# Patient Record
Sex: FEMALE | Race: WHITE
[De-identification: names, ages, dates, MRNs, and addresses within clinical notes are randomized per-mention and may not be internally consistent; named-entity substitution may affect disease eponyms.]

---

## 2019-01-28 ENCOUNTER — HOSPITAL ENCOUNTER (OUTPATIENT)
Dept: HOSPITAL 74 - JER | Age: 79
Setting detail: OBSERVATION
LOS: 2 days | Discharge: HOME HEALTH | End: 2019-01-30
Attending: NURSE PRACTITIONER | Admitting: INTERNAL MEDICINE
Payer: COMMERCIAL

## 2019-01-28 VITALS — BODY MASS INDEX: 29.3 KG/M2

## 2019-01-28 DIAGNOSIS — E78.5: ICD-10-CM

## 2019-01-28 DIAGNOSIS — G20: ICD-10-CM

## 2019-01-28 DIAGNOSIS — R55: Primary | ICD-10-CM

## 2019-01-28 DIAGNOSIS — R50.9: ICD-10-CM

## 2019-01-28 DIAGNOSIS — Z88.6: ICD-10-CM

## 2019-01-28 DIAGNOSIS — Z87.891: ICD-10-CM

## 2019-01-28 DIAGNOSIS — D72.829: ICD-10-CM

## 2019-01-28 DIAGNOSIS — Z79.82: ICD-10-CM

## 2019-01-28 DIAGNOSIS — Z88.0: ICD-10-CM

## 2019-01-28 DIAGNOSIS — I65.23: ICD-10-CM

## 2019-01-28 DIAGNOSIS — I10: ICD-10-CM

## 2019-01-28 LAB
ALBUMIN SERPL-MCNC: 4.1 G/DL (ref 3.4–5)
ALP SERPL-CCNC: 76 U/L (ref 45–117)
ALT SERPL-CCNC: 25 U/L (ref 13–61)
ANION GAP SERPL CALC-SCNC: 11 MMOL/L (ref 8–16)
ANISOCYTOSIS BLD QL: (no result)
APPEARANCE UR: (no result)
AST SERPL-CCNC: 20 U/L (ref 15–37)
BASOPHILS # BLD: 0.2 % (ref 0–2)
BILIRUB SERPL-MCNC: 0.6 MG/DL (ref 0.2–1)
BILIRUB UR STRIP.AUTO-MCNC: NEGATIVE MG/DL
BUN SERPL-MCNC: 17 MG/DL (ref 7–18)
CALCIUM SERPL-MCNC: 9.2 MG/DL (ref 8.5–10.1)
CHLORIDE SERPL-SCNC: 103 MMOL/L (ref 98–107)
CO2 SERPL-SCNC: 22 MMOL/L (ref 21–32)
COLOR UR: YELLOW
CREAT SERPL-MCNC: 0.9 MG/DL (ref 0.55–1.3)
DEPRECATED RDW RBC AUTO: 13.7 % (ref 11.6–15.6)
EOSINOPHIL # BLD: 0.6 % (ref 0–4.5)
GLUCOSE SERPL-MCNC: 110 MG/DL (ref 74–106)
HCT VFR BLD CALC: 40.8 % (ref 32.4–45.2)
HGB BLD-MCNC: 14 GM/DL (ref 10.7–15.3)
INR BLD: 1 (ref 0.83–1.09)
KETONES UR QL STRIP: (no result)
LEUKOCYTE ESTERASE UR QL STRIP.AUTO: NEGATIVE
LYMPHOCYTES # BLD: 4 % (ref 8–40)
MACROCYTES BLD QL: (no result)
MAGNESIUM SERPL-MCNC: 1.9 MG/DL (ref 1.8–2.4)
MCH RBC QN AUTO: 34.1 PG (ref 25.7–33.7)
MCHC RBC AUTO-ENTMCNC: 34.2 G/DL (ref 32–36)
MCV RBC: 99.8 FL (ref 80–96)
MONOCYTES # BLD AUTO: 2.9 % (ref 3.8–10.2)
MUCOUS THREADS URNS QL MICRO: (no result)
NEUTROPHILS # BLD: 92.3 % (ref 42.8–82.8)
NITRITE UR QL STRIP: NEGATIVE
PH UR: 5 [PH] (ref 5–8)
PLATELET # BLD AUTO: 247 K/MM3 (ref 134–434)
PLATELET BLD QL SMEAR: NORMAL
PMV BLD: 8.4 FL (ref 7.5–11.1)
POTASSIUM SERPLBLD-SCNC: 4.1 MMOL/L (ref 3.5–5.1)
PROT SERPL-MCNC: 7.4 G/DL (ref 6.4–8.2)
PROT UR QL STRIP: (no result)
PROT UR QL STRIP: NEGATIVE
PT PNL PPP: 11.8 SEC (ref 9.7–13)
RBC # BLD AUTO: 4.09 M/MM3 (ref 3.6–5.2)
SODIUM SERPL-SCNC: 136 MMOL/L (ref 136–145)
SP GR UR: 1.02 (ref 1.01–1.03)
UROBILINOGEN UR STRIP-MCNC: 2 MG/DL (ref 0.2–1)
WBC # BLD AUTO: 15.4 K/MM3 (ref 4–10)

## 2019-01-28 PROCEDURE — G0378 HOSPITAL OBSERVATION PER HR: HCPCS

## 2019-01-28 PROCEDURE — 3E033GC INTRODUCTION OF OTHER THERAPEUTIC SUBSTANCE INTO PERIPHERAL VEIN, PERCUTANEOUS APPROACH: ICD-10-PCS | Performed by: NURSE PRACTITIONER

## 2019-01-28 RX ADMIN — CARBIDOPA AND LEVODOPA SCH EACH: 25; 100 TABLET ORAL at 20:14

## 2019-01-28 RX ADMIN — ATORVASTATIN CALCIUM SCH MG: 10 TABLET, FILM COATED ORAL at 22:31

## 2019-01-28 NOTE — ECHO
______________________________________________________________________________



Name: VIVIENNALYOANDY, ANJUM                                    Exam:Adult Echocardiogram

MRN: [id number]         Study Date: 01/28/2019 03:09 PM

Age: 78 yrs

______________________________________________________________________________



Reason For Study: PRE SYNCOPE

Height: 64 in        Weight: 160 lb        BSA: 1.8 m2



______________________________________________________________________________



MMode/2D Measurements & Calculations

IVSd: 0.76 cm                                         Ao root diam: 2.7 cm

LVIDd: 4.2 cm                                         LA dimension: 3.5 cm

LVIDs: 3.0 cm

LVPWd: 0.81 cm



_______________________________________________________

EDV(Teich): 78.0 ml

ESV(Teich): 35.0 ml





______________________________________________________________________________

Procedure

The study was technically limited with all images being suboptimal in quality. There was technical li
mitations

during this study due to uncooperative patient. A limited two-dimensional transthoracic echocardiogra
m was

performed (2D).

Left Ventricle

The left ventricle is normal in size. Left ventricular systolic function is normal. Ejection Fraction
 = 55-

60%. No regional wall motion abnormalities noted.

Right Ventricle

The right ventricle is not well visualized.

Atria

The left atrial size is normal. Right atrium not well visualized.

Mitral Valve

There is mild mitral annular calcification. There is no mitral regurgitation noted.

Tricuspid Valve

The tricuspid valve is not well visualized.

Aortic Valve

The aortic valve is not well visualized.

Pulmonic Valve

The pulmonic valve is not well visualized.

Great Vessels

The aortic root is normal size.

Pericardium/Pleura

There is no pericardial effusion.



______________________________________________________________________________



Interpretation Summary

The study was technically limited with all images being suboptimal in quality.

There was technical limitations during this study due to uncooperative patient.

A limited two-dimensional transthoracic echocardiogram was performed (2D).

The left ventricle is normal in size.

Left ventricular systolic function is normal.

No regional wall motion abnormalities noted.

Ejection Fraction = 55-60%.

The right ventricle is not well visualized.

The left atrial size is normal.

Right atrium not well visualized.

There is mild mitral annular calcification.

There is no pericardial effusion.



Previous study is not available for comparison





Tylor No MD 01/28/2019 04:26 PM

## 2019-01-28 NOTE — PDOC
History of Present Illness





<Anahi Gustafson - Last Filed: 01/28/19 13:58>





- General


History Source: Patient


Exam Limitations: No Limitations





<Jay Storm - Last Filed: 01/28/19 14:30>





- General


Chief Complaint: Syncope/Near Syncope


Stated Complaint: Syncope/Near Syncope


Time Seen by Provider: 01/28/19 11:05





- History of Present Illness


Initial Comments: 





01/28/19 11:44


78y F hx of parkinsons, htn, presents with a presyncopal episode. Patient 

states she was feeling well this morning was brushing her teeth when she 

started to feel unwell, as she was leaving the bathroom her  helped her 

assist her to a laying position on the floor.  The patient denies any LOC 

however upon arrival of FD, they noted the patient was low lethargic, had weak 

pulses.  By the time EMS arrived they noticed pts bp was int he 60s, and her 

mental status seemed to be improving. They did an EKG that appeared to be vfib.

  shortly aftewards the pt regained mental status. The patient states that she 

recalls being laid down and complaining to her  about the dirty carpet - 

and recalls conversations the fire department was having upon their arrival and 

does not think she actually had LOC.  She does complain of some mild abdominal 

cramping this morning when she was brushing her teeth and she does endorse 

having a loose bowel movement during her presyncopal episode, etc. abdominal 

pain is since resolved. 





 The patient states she feels well denied any associated chest pain, 

lightheadedness, palpitations, nausea, vomiting, diaphoresis, diarrhea, BPR, 

melena, fever, chills, cough, shortness of breath, RUSSO.











PMD: Rosc


card: [email or web address]


01/28/19 12:26


 (DottyJay)





Past History





<Anahi Gustafson - Last Filed: 01/28/19 13:58>





- Past Medical History


Anemia: No


Asthma: No


Cancer: No


Cardiac Disorders: No


CVA: No


COPD: No


CHF: No


Dementia: No


Diabetes: No


GI Disorders: No


 Disorders: No


HTN: Yes


Hypercholesterolemia: No


Liver Disease: No


Seizures: No


Thyroid Disease: Yes (parkinsons,)





- Surgical History


Abdominal Surgery: Yes (gb 26 yrs ago)


Appendectomy: No


Cardiac Surgery: No


Cholecystectomy: Yes


Lung Surgery: No


Neurologic Surgery: No (2009)


Orthopedic Surgery: No





- Suicide/Smoking/Psychosocial Hx


Smoking History: Former smoker


Have you smoked in the past 12 months: No


If you are a former smoker, when did you quit?: 45 years ago


Information on smoking cessation initiated: No


Hx Alcohol Use: No


Drug/Substance Use Hx: No


Substance Use Type: None


Hx Substance Use Treatment: No





<Jay Storm - Last Filed: 01/28/19 14:30>





- Past Medical History


Allergies/Adverse Reactions: 


 Allergies











Allergy/AdvReac Type Severity Reaction Status Date / Time


 


iodine [Iodine] Allergy Intermediate Hives Verified 01/28/19 11:03


 


meperidine HCl [From Demerol] Allergy Intermediate  Verified 01/28/19 11:03


 


morphine Allergy Intermediate  Verified 01/28/19 11:03


 


chocolate flavor Allergy   Verified 01/28/19 11:03


 


Penicillins Allergy   Verified 01/28/19 11:03


 


MYCINS Allergy Intermediate  Uncoded 01/28/19 11:03











Home Medications: 


Ambulatory Orders





Aspirin [ASA -] 81 mg PO DAILY 11/07/16 


Atorvastatin Ca [Lipitor] 10 mg PO HS 11/07/16 


Carbidopa/Levodopa [Carbidopa-Levodopa  Tab] 2 each PO QID 11/07/16 


Fosinopril Sodium [Fosinopril Sodium -] 20 mg PO ASDIR 11/07/16 











Cardiac Specific PMH





- Complaint Specific PMHX


Pacemaker: No





<Jay Storm - Last Filed: 01/28/19 14:30>





**Review of Systems





<Anahi Gustafson - Last Filed: 01/28/19 13:58>





- Review of Systems


Able to Perform ROS?: Yes





<Jay Storm - Last Filed: 01/28/19 14:30>





- Review of Systems


Comments:: 





01/28/19 12:17


Constitutional - no reported Fever, Chills, 


HEENT: no reported vision changes, sore throat


Respiratory: no reported cough, sob, hemoptysis


Cardiac: +presyncope no reported chest pain, palpitations, light headedness, 

leg swelling


Abd/GI: + abd pain, diarrhea, no reported  nausea, vomiting, blood per rectum, 

melena, 


: no reported dysuria, frequency, discharge


Musculskelatal - no reported back pain, joint swelling


skin - no reported bruising, erythema, rash


neurological: no reported headache, numbness, focal weakness, tingling, ataxia, 


hematologic: no reported  easy bruising, easy bleeding


 (Jay Storm)





*Physical Exam





<Anahi Gustafson - Last Filed: 01/28/19 13:58>





<Jay Storm - Last Filed: 01/28/19 14:30>





- Vital Signs


 Last Vital Signs











Temp Pulse Resp BP Pulse Ox


 


 97.2 F L  69   20   123/42 L  97 


 


 01/28/19 10:54  01/28/19 12:03  01/28/19 12:03  01/28/19 12:03  01/28/19 12:03














- Physical Exam


Comments: 





01/28/19 12:18


GENERAL: The patient is awake, alert, and fully oriented, Nontoxic - in no 

acute distress.


HEAD: Normocephalic, atraumatic.


EYES: extraocular movements intact, sclera anicteric, conjunctiva clear.


ENT: Normal voice,  Moist mucous membranes.


NECK: Normal range of motion, supple


LUNGS: Breath sounds equal, clear to auscultation bilaterally.  No wheezes, no 

rhonchi, no rales.


HEART: Regular rate and rhythm, normal S1 and S2 without murmur, rub or gallop.


ABDOMEN: Soft, nontender,  No guarding, no rebound. No CVA tenderness


EXTREMITIES: trace edema, rigidity, +tremors


NEUROLOGICAL: No facial assymetry, Normal speech, 


PSYCH: Normal mood, normal affect.


SKIN: Warm, Dry, normal turgor, (Jya Storm)





**Heart Score/ECG Review





<Anahi Gustafson - Last Filed: 01/28/19 13:58>





<Jay Storm - Last Filed: 01/28/19 14:30>





- ECG Impressions


Comment:: 





01/28/19 12:19


Twelve-lead EKG was performed and reviewed by me. 


There is normal sinus rhythm with a normal rate. 


rate of 66 


The axis is normal. 





 (Jay Storm)





- Procedure Monitoring


Vital Signs: 


Procedure Monitoring Vital Signs











Temperature  97.2 F L  01/28/19 10:54


 


Pulse Rate  69   01/28/19 12:03


 


Respiratory Rate  20   01/28/19 12:03


 


Blood Pressure  123/42 L  01/28/19 12:03


 


O2 Sat by Pulse Oximetry (%)  97   01/28/19 12:03











ED Treatment Course





- LABORATORY


CBC & Chemistry Diagram: 


 01/28/19 11:30





 01/28/19 11:30





<Anahi Gustafson - Last Filed: 01/28/19 13:58>





- LABORATORY


CBC & Chemistry Diagram: 


 01/28/19 11:30





 01/28/19 11:30





<Jay Storm - Last Filed: 01/28/19 14:30>





- ADDITIONAL ORDERS


Additional order review: 


 Laboratory  Results











  01/28/19 01/28/19 01/28/19





  11:30 11:30 11:30


 


PT with INR   11.80 


 


INR   1.00 


 


Sodium    136


 


Potassium    4.1


 


Chloride    103


 


Carbon Dioxide    22


 


Anion Gap    11


 


BUN    17


 


Creatinine    0.9


 


Creat Clearance w eGFR    > 60


 


Random Glucose    110 H


 


Calcium    9.2


 


Magnesium    1.9


 


Total Bilirubin    0.6


 


AST    20


 


ALT    25


 


Alkaline Phosphatase    76


 


Creatine Kinase    78


 


Troponin I    < 0.02


 


B-Natriuretic Peptide  169.2  


 


Total Protein    7.4


 


Albumin    4.1








 











  01/28/19





  11:30


 


RBC  4.09


 


MCV  99.8 H


 


MCHC  34.2


 


RDW  13.7


 


MPV  8.4


 


Neutrophils %  92.3 H D


 


Lymphocytes %  4.0 L D


 


Monocytes %  2.9 L


 


Eosinophils %  0.6  D


 


Basophils %  0.2














- RADIOLOGY


Radiology Studies Ordered: 














 Category Date Time Status


 


 CHEST X-RAY PORTABLE* [RAD] Stat Radiology  01/28/19 11:10 Completed














Medical Decision Making





<Anahi Gustafson - Last Filed: 01/28/19 13:58>





<Jay Storm - Last Filed: 01/28/19 14:30>





- Medical Decision Making





01/28/19 


Case discussed with Dr. Gibson at 13:12.


Awaiting call back from Cardiology at 13:18.


Case discussed with Dr. Valles Cardiology at 13:56. 


 (Anahi Gustafson)





01/28/19 12:26


Concern for possible V. fib with her EMS tracing





Upon arrival I was bedside evaluating the patient the patient was placed on 

cardiac monitor with a defibrillator ready. 





We'll obtain blood work, troponins, electrolytes. 





Will discuss with cardiology and her primary care doctor, anticipate admission


01/28/19 13:43








The patient's lab work was reviewed it is notable for a elevated leukocytosis, 

unclear source of infection, awaiting UA. 





She has a left lites were unremarkable


Patient has been normal sinus rhythm since her arrival. 





Question of whether her original EMS tracing was artifact versus V. fib will 

discuss with cardiology if V. fib will place an ICU for monitoring. 





01/28/19 14:29


dw dr. valles 


thinks it is likely due to artifacts as there are visible qrs's. 


stable for telemetry





Case discussed in detail with admitting physician including history, physical 

exam and ancillary studies.


Admitting physician has assumed care for the patient, will follow all pending 

diagnostics and will complete the evaluation and treatment. 








 (Jay Storm)





*DC/Admit/Observation/Transfer





<Anahi Gustafson - Last Filed: 01/28/19 13:58>





- Discharge Dispostion


Decision to Admit order: Yes





<Jay Storm - Last Filed: 01/28/19 14:30>


Diagnosis at time of Disposition: 


 Pre-syncope








- Discharge Dispostion


Condition at time of disposition: Stable





- Attestations


Scribe Attestion: 





Documentation prepared by Anahi Gustafson, acting as medical scribe for Jay Storm MD. (Anahi Gustafson)

## 2019-01-28 NOTE — CON.CARD
Consult


Consult Specialty:: Cardiology


Referred by:: Medicine


Reason for Consultation:: near syncope





- History of Present Illness


Chief Complaint: near syncope


History of Present Illness: 


78F h/o parkinson's s/p brain stimulator, HTN p/w near syncope.  [place] unwell 

this morning after brushing teeth, felt lethargic, lightheadedness and son 

helped her to lie on the floor. felt diaphoretic as well no loss of 

consciousness, no chest pain, palps, dyspnea.  Noted by EMS reportedly to have 

BP in 60s at the time as well, EKG concerning for vfib, was done with brain 

stimulator turned on, patient has tremor from parkinson's (tracing reviewed, 

likely sinus with artifact).  











- Past Medical History


CNS: Yes: Parkinson's


Cardio/Vascular: Yes: HTN


Musculoskeletal: Yes: Osteoarthritis, Other (scoliosis, cervical stenosis)





- Past Surgical History


Past Surgical History: Yes: Breast Biopsy, Cataract Removal, Cholecystectomy, 

Hysterectomy





- Alcohol/Substance Use


Hx Alcohol Use: No





- Smoking History


Smoking history: Former smoker


Have you smoked in the past 12 months: No


If you are a former smoker, when did you quit?: 45 years ago





Home Medications





- Allergies


Allergies/Adverse Reactions: 


 Allergies











Allergy/AdvReac Type Severity Reaction Status Date / Time


 


iodine [Iodine] Allergy Intermediate Hives Verified 19 11:03


 


meperidine HCl [From Demerol] Allergy Intermediate  Verified 19 11:03


 


morphine Allergy Intermediate  Verified 19 11:03


 


chocolate flavor Allergy   Verified 19 11:03


 


Penicillins Allergy   Verified 19 11:03


 


MYCINS Allergy Intermediate  Uncoded 19 11:03














- Home Medications


Home Medications: 


Ambulatory Orders





Aspirin [ASA -] 81 mg PO DAILY 16 


Atorvastatin Ca [Lipitor] 10 mg PO HS 16 


Carbidopa/Levodopa [Carbidopa-Levodopa  Tab] 2 each PO TID 16 


Fosinopril Sodium [Fosinopril Sodium -] 20 mg PO DAILY 16 


Carbidopa/Levodopa [Carbidopa-Levodopa  Tab] 1 mg PO HS PRN 19 











Family Disease History





- Family Disease History


Family Disease History: Other: Father (sepsis,  at age 44), Mother (HTN, CVA

)





Review of Systems





- Review of Systems


Constitutional: reports: No Symptoms


Eyes: reports: No Symptoms


HENT: reports: No Symptoms


Neck: reports: No Symptoms


Cardiovascular: reports: No Symptoms


Respiratory: reports: No Symptoms


Gastrointestinal: reports: No Symptoms


Genitourinary: reports: No Symptoms


Musculoskeletal: reports: No Symptoms


Integumentary: reports: No Symptoms


Neurological: reports: No Symptoms


Endocrine: reports: No Symptoms


Hematology/Lymphatic: reports: No Symptoms


Psychiatric: reports: No Symptoms


Vital Signs: 


 Vital Signs











Temperature  97.2 F L  19 10:54


 


Pulse Rate  79   19 15:02


 


Respiratory Rate  20   19 15:02


 


Blood Pressure  137/47 L  19 15:02


 


O2 Sat by Pulse Oximetry (%)  97   19 12:03











Constitutional: Yes: No Distress, Calm


Eyes: Yes: Conjunctiva Clear, EOM Intact


HENT: Yes: Atraumatic, Normocephalic


Neck: Yes: Supple, Trachea Midline


Respiratory: Yes: Regular, CTA Bilaterally


Gastrointestinal: Yes: Normal Bowel Sounds, Soft


Cardiovascular: Yes: Regular Rate and Rhythm


JVD: No


Carotid Bruit: No


PMI: Non-Displaced


Heart Sounds: Yes: S1, S2


Musculoskeletal: No: Back Pain


Extremities: No: Cold


Edema: No


Peripheral Pulses WNL: Yes


Peripheral Pulses: 2+ Left Doralis Pedis, 2+ Right Dorsalis Pedis


Integumentary: No: Jaundice


Neurological: Yes: Alert, Oriented, Tremors


Psychiatric: No: Agitated





- Other Data


Labs, Other Data: 


 CBC, BMP





 19 11:30 





 19 11:30 





 INR, PTT











INR  1.00  (0.83-1.09)   19  11:30    








 Troponin, BNP











  19





  11:30 11:30


 


Troponin I  < 0.02 


 


B-Natriuretic Peptide   169.2








 Troponin, BNP











  19





  11:30 11:30


 


Troponin I  < 0.02 


 


B-Natriuretic Peptide   169.2














Assessment/Plan





EKG 2019 sinus, nl intervals, no ischemic changs, significant baseline 

artifact








echo 2016:  tds; nl lv, rv tds, hamlet, mod tr, nl rvsp





carotid ultrasound 2019 mod size plaques with calcifications  at R common 

carotid bifurcation with 50-69% stenosis, mod size plaques with calcifications 

at L common carotid bifurcation and bulb with stenosis 50-69%





tele: sinus, significant artifact





Assessment/Plan





near syncope


- reviewed tracing from EMS, likely artifact from parkinson's tremor also deep 

brain stimulator on, repeat EKG sinus rhythm


- history most consistent with vasovagal 


- trop neg x 1


- monitoring on tele


- echo ordered, if benign no further cardiac workup








HTN


-presently controlled, cont home meds





carotid stenosis


- plaque noted bilaterally at common carotid bifurcation


- less likely etiology for syncope


- continue aspirin, statin





HPL:


-cont statin





parkinson's


-on meds, per pmd/neuro

## 2019-01-28 NOTE — HP
Admitting History and Physical





- Primary Care Physician


PCP: Austin Gibson





- Admission


Chief Complaint: pre-syncope


History of Present Illness: 


78 year old female pmh of HTN, HLD, Parkinson's disease presents with a pre-

syncopal episode this am. Pt reports she was in the bathroom after having an 

episode of non bloody diarrhea and when leaving the bathroom, she felt very weak

/fatigued where she was assisted to the floor by family. Son reports pt was 

lethargic during the episode which improved within half an hour or so. Pt 

denies any LOC or prodromal symptoms such as chest pain, lightheadedness, nausea

, dizziness.  She also denies any falls or trauma. Pt denies any fever/chills, 

sick contacts, dysuria, n/v, recent medication change.  Upon arrival of EMS, pt'

s BP was in 60s, ekg revealed a rhythm appeared to be vfib. Otherwise, pt 

reports feeling well at the moment without complaints. 





In ED, pt in NSR. Labs unremarkable. Discussed w/ cardiology, suspect possible 

artifact. 











History Source: Patient, Medical Record


Limitations to Obtaining History: No Limitations, Poor Historian





- Past Medical History


CNS: Yes: Parkinson's (s/p deep brain stimulator)


Cardiovascular: Yes: HTN, Hyperlipdemia


Heme/Onc: Yes: Cancer (lerft breast ca s/p lumpectomy, xrt)


Musculoskeletal: Yes: Osteoarthritis, Other (scoliosis, cervical stenosis)





- Past Surgical History


Past Surgical History: Yes: Breast Biopsy, Cataract Removal, Cholecystectomy, 

Hysterectomy





- Smoking History


Smoking history: Former smoker


Have you smoked in the past 12 months: No


If you are a former smoker, when did you quit?: 45 years ago





- Alcohol/Substance Use


Hx Alcohol Use: No


History of Substance Use: reports: None





- Social History


Usual Living Arrangement: Yes: With Spouse


ADL: Family Assistance


History of Recent Travel: No





Home Medications





- Allergies


Allergies/Adverse Reactions: 


 Allergies











Allergy/AdvReac Type Severity Reaction Status Date / Time


 


iodine [Iodine] Allergy Intermediate Hives Verified 19 11:03


 


meperidine HCl [From Demerol] Allergy Intermediate  Verified 19 11:03


 


morphine Allergy Intermediate  Verified 19 11:03


 


chocolate flavor Allergy   Verified 19 11:03


 


Penicillins Allergy   Verified 19 11:03


 


MYCINS Allergy Intermediate  Uncoded 19 11:03














- Home Medications


Home Medications: 


Ambulatory Orders





Aspirin [ASA -] 81 mg PO DAILY 16 


Atorvastatin Ca [Lipitor] 10 mg PO HS 16 


Carbidopa/Levodopa [Carbidopa-Levodopa  Tab] 2 each PO TID 16 


Fosinopril Sodium [Fosinopril Sodium -] 20 mg PO DAILY 16 


Carbidopa/Levodopa [Carbidopa-Levodopa  Tab] 1 mg PO HS PRN 19 











Family Disease History





- Family Disease History


Family Disease History: Other: Father (sepsis,  at age 44), Mother (HTN, CVA

)





Review of Systems


Findings/Remarks: 


as per hpi





Physical Examination


Vital Signs: 


 Vital Signs











Temperature  97.2 F L  19 10:54


 


Pulse Rate  69   19 12:03


 


Respiratory Rate  20   19 12:03


 


Blood Pressure  123/42 L  19 12:03


 


O2 Sat by Pulse Oximetry (%)  97   19 12:03











Constitutional: Yes: Well Nourished, No Distress


Cardiovascular: Yes: Regular Rate and Rhythm


Respiratory: Yes: WNL, Regular, CTA Bilaterally.  No: Accessory Muscle Use, SOB

, Tachypnea, Wheezes


Gastrointestinal: Yes: WNL, Normal Bowel Sounds, Soft, Abdomen, Obese.  No: 

Distention, Tenderness


Musculoskeletal: Yes: Back Pain


Extremities: Yes: WNL


Edema: Yes


Edema: LLE: Trace, RLE: Trace


Neurological: Yes: Alert, Oriented, Pre-Existing Deficit, Tremors.  No: Lethargy


Psychiatric: Yes: WNL, Alert, Oriented


Labs: 


 CBC, BMP





 19 11:30 





 19 11:30 











Problem List





- Problems


(1) Pre-syncope


Assessment/Plan: 


suspect 2/2 vasovagal 


ekg- nsr, suspect vfib artifact


pt at baseline now


orthostatics ordered 


echo ordered


carotids w/ b/l w/ hemodynamically sig stenosis, 50-69%


vascular consulted, defer cta to vascular 


case discussed w/ cardiology  


tele monitoring 


Code(s): R55 - SYNCOPE AND COLLAPSE   





(2) Leukocytosis


Assessment/Plan: 


trend 


if further diarrhea, will order cdiff 


ua/uc pending 


monitor 


Code(s): D72.829 - ELEVATED WHITE BLOOD CELL COUNT, UNSPECIFIED   


Qualifiers: 


   Leukocytosis type: unspecified   Qualified Code(s): D72.829 - Elevated white 

blood cell count, unspecified   





(3) Parkinson disease


Assessment/Plan: 


stable


continue sinemet


PT eval 


Code(s): G20 - PARKINSON'S DISEASE   





(4) HTN (hypertension)


Assessment/Plan: 


controlled 


continue home regimen 


Code(s): I10 - ESSENTIAL (PRIMARY) HYPERTENSION   


Qualifiers: 


   Hypertension type: essential hypertension   Qualified Code(s): I10 - 

Essential (primary) hypertension   





(5) HLD (hyperlipidemia)


Assessment/Plan: 


chronic


continue statin 


Code(s): E78.5 - HYPERLIPIDEMIA, UNSPECIFIED

## 2019-01-28 NOTE — EKG
Test Reason : 

Blood Pressure : ***/*** mmHG

Vent. Rate : 066 BPM     Atrial Rate : 066 BPM

   P-R Int : 144 ms          QRS Dur : 092 ms

    QT Int : 410 ms       P-R-T Axes : 089 -22 035 degrees

   QTc Int : 429 ms

 

NORMAL SINUS RHYTHM

BASELINE ARTIFACT

WHEN COMPARED WITH ECG OF 07-NOV-2016 15:37,

DIFFICULT TO COMPARE DUE TO PREVIOUS ECG ARTIFACTS

Confirmed by VIJAY FUENTES MD ([id number]) on 1/28/2019 1:41:57 PM

 

Referred By:             Confirmed By:VIJAY FUENTES MD

## 2019-01-29 LAB
ANION GAP SERPL CALC-SCNC: 8 MMOL/L (ref 8–16)
BASOPHILS # BLD: 0.3 % (ref 0–2)
BUN SERPL-MCNC: 16 MG/DL (ref 7–18)
CALCIUM SERPL-MCNC: 7.6 MG/DL (ref 8.5–10.1)
CHLORIDE SERPL-SCNC: 103 MMOL/L (ref 98–107)
CHOLEST SERPL-MCNC: 119 MG/DL (ref 50–200)
CO2 SERPL-SCNC: 25 MMOL/L (ref 21–32)
CREAT SERPL-MCNC: 0.7 MG/DL (ref 0.55–1.3)
DEPRECATED RDW RBC AUTO: 13.7 % (ref 11.6–15.6)
EOSINOPHIL # BLD: 0.7 % (ref 0–4.5)
GLUCOSE SERPL-MCNC: 96 MG/DL (ref 74–106)
HCT VFR BLD CALC: 35.6 % (ref 32.4–45.2)
HDLC SERPL-MCNC: 67 MG/DL (ref 40–60)
HGB BLD-MCNC: 12.5 GM/DL (ref 10.7–15.3)
LYMPHOCYTES # BLD: 9.9 % (ref 8–40)
MAGNESIUM SERPL-MCNC: 1.8 MG/DL (ref 1.8–2.4)
MCH RBC QN AUTO: 34.4 PG (ref 25.7–33.7)
MCHC RBC AUTO-ENTMCNC: 35.1 G/DL (ref 32–36)
MCV RBC: 97.9 FL (ref 80–96)
MONOCYTES # BLD AUTO: 10.9 % (ref 3.8–10.2)
NEUTROPHILS # BLD: 78.2 % (ref 42.8–82.8)
PHOSPHATE SERPL-MCNC: 2.6 MG/DL (ref 2.5–4.9)
PLATELET # BLD AUTO: 207 K/MM3 (ref 134–434)
PMV BLD: 8.3 FL (ref 7.5–11.1)
POTASSIUM SERPLBLD-SCNC: 3.8 MMOL/L (ref 3.5–5.1)
RBC # BLD AUTO: 3.63 M/MM3 (ref 3.6–5.2)
SODIUM SERPL-SCNC: 135 MMOL/L (ref 136–145)
TRIGL SERPL-MCNC: 69 MG/DL (ref 0–150)
WBC # BLD AUTO: 5.5 K/MM3 (ref 4–10)

## 2019-01-29 RX ADMIN — CARBIDOPA AND LEVODOPA SCH EACH: 25; 100 TABLET ORAL at 18:32

## 2019-01-29 RX ADMIN — ATORVASTATIN CALCIUM SCH MG: 10 TABLET, FILM COATED ORAL at 21:54

## 2019-01-29 RX ADMIN — LISINOPRIL SCH: 20 TABLET ORAL at 09:29

## 2019-01-29 RX ADMIN — CARBIDOPA AND LEVODOPA SCH EACH: 25; 100 TABLET ORAL at 10:08

## 2019-01-29 RX ADMIN — CARBIDOPA AND LEVODOPA SCH EACH: 25; 100 TABLET ORAL at 14:38

## 2019-01-29 RX ADMIN — LISINOPRIL SCH MG: 20 TABLET ORAL at 08:32

## 2019-01-29 NOTE — PN
Progress Note, Physician


Chief Complaint: 


Pt lying in bed in no acute distress. reports feeling well. no more diarrhea 

since yesterday. denies any chest pain, sob, n/v. pt reports everyone at home 

is also sick w/ fever/diarrhea





- Current Medication List


Current Medications: 


Active Medications





Acetaminophen (Tylenol -)  650 mg PO Q6H PRN


   PRN Reason: Fever Or Pain Level 1-5


   Last Admin: 01/29/19 03:56 Dose:  650 mg


Aspirin (Asa -)  81 mg PO DAILY [facility]


Atorvastatin Calcium (Lipitor -)  10 mg PO HS [facility]


   Last Admin: 01/28/19 22:31 Dose:  10 mg


Carbidopa/Levodopa (Sinemet 25/100 -)  2 each PO TID@1000,1400,1800 [facility]


   Last Admin: 01/28/19 20:14 Dose:  2 each


Lisinopril (Prinivil)  20 mg PO DAILY [facility]


   Last Admin: 01/29/19 09:29 Dose:  Not Given


Magnesium Sulfate (Magnesium Sulf 2 G/50 Ml Bag)  2 gm IVPB ONCE ONE


   Stop: 01/29/19 10:01











- Objective


Vital Signs: 


 Vital Signs











Temperature  98.6 F   01/29/19 06:00


 


Pulse Rate  59 L  01/29/19 06:00


 


Respiratory Rate  16   01/29/19 06:00


 


Blood Pressure  132/53 L  01/29/19 06:00


 


O2 Sat by Pulse Oximetry (%)  94 L  01/29/19 09:04











Constitutional: Yes: Well Nourished, No Distress


Cardiovascular: Yes: Regular Rate and Rhythm


Respiratory: Yes: WNL, Regular, CTA Bilaterally, On Nasal O2.  No: Accessory 

Muscle Use, SOB, Tachypnea, Wheezes


Gastrointestinal: Yes: WNL, Normal Bowel Sounds, Soft, Abdomen, Obese.  No: 

Distention, Tenderness


Genitourinary: Yes: WNL


Extremities: Yes: WNL


Edema: No


Neurological: Yes: WNL, Alert, Oriented, Tremors


Psychiatric: Yes: WNL, Alert, Oriented


Labs: 


 CBC, BMP





 01/29/19 05:15 





 01/29/19 05:15 





 INR, PTT











INR  1.00  (0.83-1.09)   01/28/19  11:30    














Assessment/Plan


(1) Pre-syncope


Assessment/Plan: 


pt currently at baseline


suspect possible vasovagal episode at home 


ekg- nsr


echo unremarkable 


carotids w/ b/l w/ hemodynamically sig stenosis, 50-69%- no further 

intervention per vascular 


case discussed w/ cardiology  


tele monitoring 


Code(s): R55 - SYNCOPE AND COLLAPSE   





(2) Leukocytosis


Assessment/Plan: 


resolved 


Code(s): D72.829 - ELEVATED WHITE BLOOD CELL COUNT, UNSPECIFIED   


Qualifiers: 


   Leukocytosis type: unspecified   Qualified Code(s): D72.829 - Elevated white 

blood cell count, unspecified   





(3) Fever


Assessment/Plan: 


febrile overnight 


family sick w/ diarrhea/fever


suspect viral gasteroenteritis 


await blood/urine cultures 


pt needs to afebrile for 24 hours before d/c 


Code(s): R50.9 - FEVER, UNSPECIFIED   


Qualifiers: 


   Encounter type: initial encounter 





(4) Parkinson disease


Assessment/Plan: 


stable


continue sinemet


PT eval 


Code(s): G20 - PARKINSON'S DISEASE   





(5) HTN (hypertension)


Assessment/Plan: 


controlled 


continue home regimen 


Code(s): I10 - ESSENTIAL (PRIMARY) HYPERTENSION   


Qualifiers: 


   Hypertension type: essential hypertension   Qualified Code(s): I10 - 

Essential (primary) hypertension   





(6) HLD (hyperlipidemia)


Assessment/Plan: 


chronic


continue statin 


Code(s): E78.5 - HYPERLIPIDEMIA, UNSPECIFIED   








Dispo: Home w/ VNS, anticipate discharge tomorrow if no clinical changes

## 2019-01-29 NOTE — PN
Progress Note (short form)





- Note


Progress Note: 





s: no chest pain, palps, dizziness, dyspnea, edema





Vital Signs: 


 Vital Signs











 Period  Temp  Pulse  Resp  BP Sys/Redding  Pulse Ox


 


 Last 24 Hr  97.2 F-100.1 F  59-79  16-22  116-158/42-66  92-97














Constitutional: Yes: No Distress, Calm


Eyes: Yes: Conjunctiva Clear, EOM Intact


HENT: Yes: Atraumatic, Normocephalic


Neck: Yes: Supple, Trachea Midline


Respiratory: Yes: Regular, CTA Bilaterally


Gastrointestinal: Yes: Normal Bowel Sounds, Soft


Cardiovascular: Yes: Regular Rate and Rhythm


JVD: No


Carotid Bruit: No


PMI: Non-Displaced


Heart Sounds: Yes: S1, S2


Musculoskeletal: No: Back Pain


Extremities: No: Cold


Edema: No


Peripheral Pulses WNL: Yes


Peripheral Pulses: 2+ Left Doralis Pedis, 2+ Right Dorsalis Pedis


Integumentary: No: Jaundice


Neurological: Yes: Alert, Oriented, Tremors


Psychiatric: No: Agitated








Assessment/Plan





EKG 1/2019 sinus, nl intervals, no ischemic changs, significant baseline 

artifact








echo 11/2016:  tds; nl lv, rv tds, hamlet, mod tr, nl rvsp





echo 1/2019 tds, nl LV fn, RV not well visualized, mild MAC





carotid ultrasound 1/2019 mod size plaques with calcifications  at R common 

carotid bifurcation with 50-69% stenosis, mod size plaques with calcifications 

at L common carotid bifurcation and bulb with stenosis 50-69%





tele: sinus, artifact





Assessment/Plan





near syncope


- reviewed tracing from EMS, likely artifact from parkinson's tremor also deep 

brain stimulator on, repeat EKG sinus rhythm


- history most consistent with vasovagal 


- trop neg x 2


- no events on tele


- echo unremarkable





HTN


-presently controlled, cont home meds





carotid stenosis


- plaque noted bilaterally at common carotid bifurcation


- less likely etiology for syncope


- continue aspirin, statin





HPL:


-cont statin





parkinson's


-on meds, per pmd/neuro

## 2019-01-29 NOTE — CONSULT
- Consultation


REQUESTING PROVIDER: Kenneth Edgar





CONSULT REQUEST: We have been asked to surgically evaluate this patient for 

carotid stenosis





PCP: Adebayo Scott MD





HPI: Called to eval 79yo female w/ PMHx as noted below. Per patient, while 

brushing her teeth yesterday morning, suddenly became dizzy/lightheaded. Called 

for her son to help her lie down on the floor. Patient states that while laying 

on the floor she became diaphoretic. Informs me that the event only lasted a 

minute or so...quickly resolved on it's own per patient. Denies CP, palpitations

, RUSSO, SOB prior too episode described above. Denies n/v/f/c, peripheral edema, 

irregular heart beat. Denies HA, parasthesias or mental status changes. States 

she has experienced this once before a few years ago, cardiac work-up negative 

per patient. Patient states she's had diarrhea prior to coming to hospital and 

that family members at home also have similar symptoms.


While in the ED patient had a Carotid U/S as part of her NEAR SYNCOPE episode --

> moderate size plaques with calcifications at RCC bifurcation with 50-69% 

stenosis, moderate size plaques with calcifications at LCC bifurcation and bulb 

with stenosis 50-69%. EKG NSR @ 66bpm.





Smoking history: Former smoker (quit 45 years ago)





PMHx: Parkinson's, HTN, Osteoarthritis, Other (scoliosis, cervical stenosis)   

      





PSHx: Breast Biopsy, Cataract Removal, Cholecystectomy, Hysterectomy. Deep 

brain stimulator    





Home Meds


Aspirin [ASA -] 81 mg PO DAILY 11/07/16 


Atorvastatin Ca [Lipitor] 10 mg PO HS 11/07/16 


Carbidopa/Levodopa [Carbidopa-Levodopa  Tab] 2 each PO TID 11/07/16 


Fosinopril Sodium [Fosinopril Sodium -] 20 mg PO DAILY 11/07/16 


Carbidopa/Levodopa [Carbidopa-Levodopa  Tab] 1 mg PO HS PRN 01/28/19 





Allergies





 3





iodine [Iodine] Allergy Intermediate Hives Verified 01/28/19 11:03


 


meperidine HCl [From Demerol] Allergy Intermediate  Verified 01/28/19 11:03


 


morphine Allergy Intermediate  Verified 01/28/19 11:03


 


chocolate flavor Allergy   Verified 01/28/19 11:03


 


Penicillins Allergy   Verified 01/28/19 11:03


 


MYCINS Allergy Intermediate  Uncoded 01/28/19 11:03








ROS:


CONSTITUTIONAL: Absent: generalized weakness, malaise, loss of appetite, weight 

change


CARDIOVASCULAR: Absent: See HPI


RESPIRATORY: Absent: cough, wheezing, stridor, hemoptysis


GASTROINTESTINAL:Absent: abdominal pain, abdominal distension, diarrhea, 

constipation, melena, hematochezia


GENITOURINARY: Absent: dysuria, frequency, urgency, hesitancy, hematuria, flank 

pain, genital pain


MUSCULOSKELETAL: Absent: myalgia, arthralgia, joint swelling, back pain, neck 

pain


SKIN: Absent: rash, itching, pallor


HEMATOLOGIC/IMMUNOLOGIC: Absent: easy bleeding, easy bruising, lymphadenopathy


NEUROLOGIC: Absent: focal weakness, unsteady gait, seizure 


PSYCHIATRIC: Absent: anxiety, depression, suicidal or homicidal ideation, 

hallucinations.





PE:


GENERAL: Awake, alert, and fully oriented, in no acute distress.


HEAD: Normal with no signs of trauma.


EYES: PERRL, sclera anicteric, conjunctiva clear.


NECK: Normal ROM, supple without lymphadenopathy, JVD, or masses. Negative bruit


LUNGS: CTA bilat. No wheezes, and no crackles. No accessory muscle use.


HEART: RRR. No murmurs detected.


ABD: Soft, NT. ND.


MUSCULOSKELETAL: Normal ROM at all joints. No bony deformities or tenderness. 

No CVA tenderness.


UE: 2+ pulses, warm, well-perfused. No cyanosis. Cap refill <2 seconds. No 

peripheral edema.


LE: 2+ pulses, warm, well-perfused. No calf tenderness. No peripheral edema. 


NEUROLOGICAL: Normal speech, gait not observed.


PSYCH: Cooperative. Good eye contact. Appropriate mood and affect.


SKIN: Warm, dry, normal turgor, no rashes or lesions noted.





 Last Vital Signs











Temp Pulse Resp BP Pulse Ox


 


 98.6 F   59 L  16   132/53 L  94 L


 


 01/29/19 06:00  01/29/19 06:00  01/29/19 06:00  01/29/19 06:00  01/29/19 05:48








 CBC, BMP





 01/29/19 05:15 





 01/29/19 05:15 





 INR, PTT











INR  1.00  (0.83-1.09)   01/28/19  11:30    




















Problem List





- Problems


(1) Near syncope


Assessment/Plan: 





Admitted to [facility] s/p near syncopal episode. Mild dehydration most likely 

secondary to diarrhea (resolved) coupled with decreased PO intake





Cont Tele monitoring


Findings on U/S reviewed


Cardio following


DVT PPX


No vascular surgical intervention


Replete elytes PRN


Above plan discussed with Dr. Nelson and agrees


Code(s): R55 - SYNCOPE AND COLLAPSE   





(2) HTN (hypertension)


Code(s): I10 - ESSENTIAL (PRIMARY) HYPERTENSION   


Qualifiers: 


   Hypertension type: essential hypertension   Qualified Code(s): I10 - 

Essential (primary) hypertension   





(3) Parkinson disease


Code(s): G20 - PARKINSON'S DISEASE   





Visit type





- Case Type


Case Type: ED Admission





- Emergency


Emergency Visit: Yes


ED Registration Date: 01/28/19


Care time: The patient presented to the Emergency Department on the above date 

and was hospitalized for further evaluation of their emergent condition.





- New patient


This patient is new to me today: Yes


Date on this admission: 01/29/19

## 2019-01-30 VITALS — TEMPERATURE: 98.3 F

## 2019-01-30 VITALS — SYSTOLIC BLOOD PRESSURE: 147 MMHG | DIASTOLIC BLOOD PRESSURE: 62 MMHG | HEART RATE: 64 BPM

## 2019-01-30 LAB
ANION GAP SERPL CALC-SCNC: 8 MMOL/L (ref 8–16)
ANISOCYTOSIS BLD QL: (no result)
BASOPHILS # BLD: 0.6 % (ref 0–2)
BUN SERPL-MCNC: 17 MG/DL (ref 7–18)
CALCIUM SERPL-MCNC: 8.1 MG/DL (ref 8.5–10.1)
CHLORIDE SERPL-SCNC: 105 MMOL/L (ref 98–107)
CO2 SERPL-SCNC: 25 MMOL/L (ref 21–32)
CREAT SERPL-MCNC: 0.6 MG/DL (ref 0.55–1.3)
DEPRECATED RDW RBC AUTO: 13.5 % (ref 11.6–15.6)
EOSINOPHIL # BLD: 6.5 % (ref 0–4.5)
GLUCOSE SERPL-MCNC: 86 MG/DL (ref 74–106)
HCT VFR BLD CALC: 35.8 % (ref 32.4–45.2)
HGB BLD-MCNC: 12.5 GM/DL (ref 10.7–15.3)
LYMPHOCYTES # BLD: 22.5 % (ref 8–40)
MACROCYTES BLD QL: (no result)
MAGNESIUM SERPL-MCNC: 2.1 MG/DL (ref 1.8–2.4)
MCH RBC QN AUTO: 34.4 PG (ref 25.7–33.7)
MCHC RBC AUTO-ENTMCNC: 34.8 G/DL (ref 32–36)
MCV RBC: 98.8 FL (ref 80–96)
MONOCYTES # BLD AUTO: 12.2 % (ref 3.8–10.2)
NEUTROPHILS # BLD: 58.2 % (ref 42.8–82.8)
PHOSPHATE SERPL-MCNC: 2.8 MG/DL (ref 2.5–4.9)
PLATELET # BLD AUTO: 188 K/MM3 (ref 134–434)
PLATELET BLD QL SMEAR: NORMAL
PMV BLD: 8.7 FL (ref 7.5–11.1)
POTASSIUM SERPLBLD-SCNC: 3.9 MMOL/L (ref 3.5–5.1)
RBC # BLD AUTO: 3.62 M/MM3 (ref 3.6–5.2)
SODIUM SERPL-SCNC: 137 MMOL/L (ref 136–145)
WBC # BLD AUTO: 5.7 K/MM3 (ref 4–10)

## 2019-01-30 RX ADMIN — LISINOPRIL SCH MG: 20 TABLET ORAL at 09:54

## 2019-01-30 RX ADMIN — CARBIDOPA AND LEVODOPA SCH EACH: 25; 100 TABLET ORAL at 09:54

## 2019-01-30 NOTE — PN
Progress Note (short form)





- Note


Progress Note: 





s: no chest pain, palps, dizziness, dyspnea, edema





Vital Signs: 


  Vital Signs











 Period  Temp  Pulse  Resp  BP Sys/Redding  Pulse Ox


 


 Last 24 Hr  98 F-98.4 F  52-70  18-22  /40-58  93-93














Constitutional: Yes: No Distress, Calm


Eyes: Yes: Conjunctiva Clear, EOM Intact


HENT: Yes: Atraumatic, Normocephalic


Neck: Yes: Supple, Trachea Midline


Respiratory: Yes: Regular, CTA Bilaterally


Gastrointestinal: Yes: Normal Bowel Sounds, Soft


Cardiovascular: Yes: Regular Rate and Rhythm


JVD: No


Carotid Bruit: No


PMI: Non-Displaced


Heart Sounds: Yes: S1, S2


Musculoskeletal: No: Back Pain


Extremities: No: Cold


Edema: No


Peripheral Pulses WNL: Yes


Peripheral Pulses: 2+ Left Doralis Pedis, 2+ Right Dorsalis Pedis


Integumentary: No: Jaundice


Neurological: Yes: Alert, Oriented, Tremors


Psychiatric: No: Agitated





 Current Medications





Acetaminophen (Tylenol -)  650 mg PO Q6H PRN


   PRN Reason: Fever Or Pain Level 1-5


   Last Admin: 01/29/19 03:56 Dose:  650 mg


Aspirin (Asa -)  81 mg PO HS [facility]


   Last Admin: 01/29/19 21:54 Dose:  81 mg


Atorvastatin Calcium (Lipitor -)  10 mg PO HS [facility]


   Last Admin: 01/29/19 21:54 Dose:  10 mg


Carbidopa/Levodopa (Sinemet 25/100 -)  2 each PO TID@1000,1400,1800 [facility]


   Last Admin: 01/30/19 09:54 Dose:  2 each


Lisinopril (Prinivil)  20 mg PO DAILY [facility]


   Last Admin: 01/30/19 09:54 Dose:  20 mg











Assessment/Plan





EKG 1/2019 sinus, nl intervals, no ischemic changs, significant baseline 

artifact








echo 11/2016:  tds; nl lv, rv tds, hamlet, mod tr, nl rvsp





echo 1/2019 tds, nl LV fn, RV not well visualized, mild MAC





carotid ultrasound 1/2019 mod size plaques with calcifications  at R common 

carotid bifurcation with 50-69% stenosis, mod size plaques with calcifications 

at L common carotid bifurcation and bulb with stenosis 50-69%





tele: sinus, artifact





Assessment/Plan





near syncope


- reviewed tracing from EMS, likely artifact from parkinson's tremor also deep 

brain stimulator on, repeat EKG sinus rhythm


- history most consistent with vasovagal, dehydration causing near syncope, 

unlikely cardiac etiology


- trop neg x 2


- no events on tele


- echo unremarkable





HTN


-presently controlled, cont home meds





carotid stenosis


- plaque noted bilaterally at common carotid bifurcation


- less likely etiology for syncope


- continue aspirin, statin





HPL:


-cont statin





parkinson's


-on meds, per pmd/neuro

## 2019-09-14 ENCOUNTER — HOSPITAL ENCOUNTER (EMERGENCY)
Dept: HOSPITAL 74 - JER | Age: 79
Discharge: HOME | End: 2019-09-14
Payer: COMMERCIAL

## 2019-09-14 VITALS — BODY MASS INDEX: 27.4 KG/M2

## 2019-09-14 VITALS — DIASTOLIC BLOOD PRESSURE: 84 MMHG | SYSTOLIC BLOOD PRESSURE: 138 MMHG | HEART RATE: 94 BPM | TEMPERATURE: 98.8 F

## 2019-09-14 DIAGNOSIS — R53.1: Primary | ICD-10-CM

## 2019-09-14 DIAGNOSIS — E78.5: ICD-10-CM

## 2019-09-14 DIAGNOSIS — G20: ICD-10-CM

## 2019-09-14 DIAGNOSIS — I10: ICD-10-CM

## 2019-09-14 LAB
ALBUMIN SERPL-MCNC: 3.6 G/DL (ref 3.4–5)
ALP SERPL-CCNC: 90 U/L (ref 45–117)
ALT SERPL-CCNC: 8 U/L (ref 13–61)
ANION GAP SERPL CALC-SCNC: 7 MMOL/L (ref 8–16)
APPEARANCE UR: CLEAR
AST SERPL-CCNC: 15 U/L (ref 15–37)
BASOPHILS # BLD: 0.4 % (ref 0–2)
BILIRUB SERPL-MCNC: 0.5 MG/DL (ref 0.2–1)
BILIRUB UR STRIP.AUTO-MCNC: NEGATIVE MG/DL
BNP SERPL-MCNC: 290.3 PG/ML (ref 5–450)
BUN SERPL-MCNC: 14.8 MG/DL (ref 7–18)
CALCIUM SERPL-MCNC: 9 MG/DL (ref 8.5–10.1)
CHLORIDE SERPL-SCNC: 102 MMOL/L (ref 98–107)
CO2 SERPL-SCNC: 28 MMOL/L (ref 21–32)
COLOR UR: YELLOW
CREAT SERPL-MCNC: 0.7 MG/DL (ref 0.55–1.3)
DEPRECATED RDW RBC AUTO: 14 % (ref 11.6–15.6)
EOSINOPHIL # BLD: 2.4 % (ref 0–4.5)
GLUCOSE SERPL-MCNC: 91 MG/DL (ref 74–106)
HCT VFR BLD CALC: 36.1 % (ref 32.4–45.2)
HGB BLD-MCNC: 12 GM/DL (ref 10.7–15.3)
INR BLD: 1.02 (ref 0.83–1.09)
KETONES UR QL STRIP: NEGATIVE
LEUKOCYTE ESTERASE UR QL STRIP.AUTO: NEGATIVE
LYMPHOCYTES # BLD: 15.9 % (ref 8–40)
MAGNESIUM SERPL-MCNC: 2.2 MG/DL (ref 1.8–2.4)
MCH RBC QN AUTO: 32.9 PG (ref 25.7–33.7)
MCHC RBC AUTO-ENTMCNC: 33.2 G/DL (ref 32–36)
MCV RBC: 99.1 FL (ref 80–96)
MONOCYTES # BLD AUTO: 8.6 % (ref 3.8–10.2)
NEUTROPHILS # BLD: 72.7 % (ref 42.8–82.8)
NITRITE UR QL STRIP: NEGATIVE
PH UR: 7 [PH] (ref 5–8)
PLATELET # BLD AUTO: 249 K/MM3 (ref 134–434)
PMV BLD: 8.5 FL (ref 7.5–11.1)
POTASSIUM SERPLBLD-SCNC: 4.1 MMOL/L (ref 3.5–5.1)
PROT SERPL-MCNC: 6.7 G/DL (ref 6.4–8.2)
PROT UR QL STRIP: NEGATIVE
PROT UR QL STRIP: NEGATIVE
PT PNL PPP: 12 SEC (ref 9.7–13)
RBC # BLD AUTO: 3.65 M/MM3 (ref 3.6–5.2)
SODIUM SERPL-SCNC: 137 MMOL/L (ref 136–145)
SP GR UR: 1.01 (ref 1.01–1.03)
UROBILINOGEN UR STRIP-MCNC: 0.2 MG/DL (ref 0.2–1)
WBC # BLD AUTO: 9.2 K/MM3 (ref 4–10)

## 2019-09-14 PROCEDURE — 3E0337Z INTRODUCTION OF ELECTROLYTIC AND WATER BALANCE SUBSTANCE INTO PERIPHERAL VEIN, PERCUTANEOUS APPROACH: ICD-10-PCS | Performed by: EMERGENCY MEDICINE

## 2019-09-14 NOTE — PDOC
History of Present Illness





- General


Chief Complaint: Weakness


Stated Complaint: HI BLOOD PRESSURE


Time Seen by Provider: 09/14/19 19:51


History Source: Patient


Exam Limitations: No Limitations





Past History





- Past Medical History


Allergies/Adverse Reactions: 


 Allergies











Allergy/AdvReac Type Severity Reaction Status Date / Time


 


iodine [Iodine] Allergy Intermediate Hives Verified 01/28/19 11:03


 


meperidine HCl [From Demerol] Allergy Intermediate  Verified 01/28/19 11:03


 


morphine Allergy Intermediate  Verified 01/28/19 11:03


 


chocolate flavor Allergy   Verified 01/28/19 11:03


 


Penicillins Allergy   Verified 01/28/19 11:03


 


MYCINS Allergy Intermediate  Uncoded 01/28/19 11:03











Home Medications: 


Ambulatory Orders





Aspirin [ASA -] 81 mg PO DAILY 11/07/16 


Atorvastatin Ca [Lipitor] 10 mg PO HS 11/07/16 


Carbidopa/Levodopa [Carbidopa-Levodopa  Tab] 2 each PO TID 11/07/16 


Fosinopril Sodium [Fosinopril Sodium -] 20 mg PO DAILY 11/07/16 


Carbidopa/Levodopa [Carbidopa-Levodopa  Tab] 1 mg PO HS PRN 01/28/19 








Anemia: No


Asthma: No


Cancer: No


Cardiac Disorders: No


CVA: No


COPD: No


CHF: No


Dementia: No


Diabetes: No


GI Disorders: No


 Disorders: No


HTN: Yes


Hypercholesterolemia: No


Liver Disease: No


Seizures: No


Thyroid Disease: Yes (parkinsons,)





- Surgical History


Abdominal Surgery: Yes (gb 26 yrs ago)


Appendectomy: No


Cardiac Surgery: No


Cholecystectomy: Yes


Lung Surgery: No


Neurologic Surgery: No (2009)


Orthopedic Surgery: No





- Suicide/Smoking/Psychosocial Hx


Smoking History: Never smoked


Have you smoked in the past 12 months: No


If you are a former smoker, when did you quit?: 45 years ago


Information on smoking cessation initiated: No


Hx Alcohol Use: No


Drug/Substance Use Hx: No


Substance Use Type: None


Hx Substance Use Treatment: No





*Physical Exam





- Vital Signs


 Last Vital Signs











Temp Pulse Resp BP Pulse Ox


 


 98.8 F   94 H  20   138/84    


 


 09/14/19 17:52  09/14/19 17:52  09/14/19 17:52  09/14/19 17:52   














ED Treatment Course





- LABORATORY


CBC & Chemistry Diagram: 


 09/14/19 18:59





 09/14/19 18:59





- ADDITIONAL ORDERS


Additional order review: 


 Laboratory  Results











  09/14/19 09/14/19





  18:59 18:59


 


PT with INR  12.00 


 


INR  1.02 


 


Sodium   137


 


Potassium   4.1


 


Chloride   102


 


Carbon Dioxide   28


 


Anion Gap   7 L


 


BUN   14.8


 


Creatinine   0.7


 


Est GFR (CKD-EPI)AfAm   94.84


 


Est GFR (CKD-EPI)NonAf   81.83


 


Random Glucose   91


 


Calcium   9.0


 


Magnesium   2.2


 


Total Bilirubin   0.5


 


AST   15


 


ALT   8 L


 


Alkaline Phosphatase   90


 


Creatine Kinase   55


 


Troponin I   < 0.02


 


B-Natriuretic Peptide   290.3


 


Total Protein   6.7


 


Albumin   3.6


 


TSH   2.26








 











  09/14/19





  18:59


 


RBC  3.65


 


MCV  99.1 H


 


MCHC  33.2


 


RDW  14.0


 


MPV  8.5


 


Neutrophils %  72.7  D


 


Lymphocytes %  15.9  D


 


Monocytes %  8.6


 


Eosinophils %  2.4


 


Basophils %  0.4














- RADIOLOGY


Radiology Studies Ordered: 














 Category Date Time Status


 


 CHEST X-RAY PORTABLE* [RAD] Stat Radiology  09/14/19 18:51 Taken














- Medications


Given in the ED: 


ED Medications














Discontinued Medications














Generic Name Dose Route Start Last Admin





  Trade Name Freq  PRN Reason Stop Dose Admin


 


Sodium Chloride  500 mls @ 500 mls/hr  09/14/19 18:51  09/14/19 19:14





  Normal Saline -  IV  09/14/19 19:50  500 mls/hr





  ASDIR STA   Administration





     





     





     





     














*DC/Admit/Observation/Transfer


Diagnosis at time of Disposition: 


 Weakness, Parkinson disease, HLD (hyperlipidemia)








- Discharge Dispostion


Disposition: HOME


Decision to Admit order: No





- Referrals


Referrals: 


Austin Gibson MD [Primary Care Provider] - 





- Patient Instructions


Printed Discharge Instructions:  DI for Muscle Weakness


Additional Instructions: 


You were seen in the emergency department for the evaluation of your weakness. 

Your labs are within normal limits., Your heart rhythm is unchanged from 

previous. Your chest xray was within normal limits. Please follow up with your 

primary medical doctor within 1 week after discharge. Please return to our 

department if you have worsening symptoms or new concerning symptoms. Thank 

you. 





- Post Discharge Activity

## 2019-09-14 NOTE — PDOC
Attending Attestation





- Resident


Resident Name: ShilohPankaj





- ED Attending Attestation


I have performed the following: I have examined & evaluated the patient, The 

case was reviewed & discussed with the resident, I agree w/resident's findings 

& plan





- HPI


HPI: 





09/14/19 21:07


Pt came in for a sense of generalized weakness. She has no other complaints.She 

has a hx of Parkingson's disease and she has to get up 4x nightly to urinate 

and this has been going on for a long time.  SHe has no dysuria.


She has no fevers and no chills and she has a normal diet. She lives with her 

 and her son is at her side.


Pt is A+Ox3





- Physicial Exam


PE: 





09/14/19 21:09


Agree with resident exam. Pt has clear lung field and Normal Heart rate.


SHe has no flnak pain and no abd pain.  She has slight pitting edema of her 

legs.


She has no rashes.


SHe is moving all her extremities.





- Medical Decision Making





09/16/19 04:05


Labs normal; UA normal; Pt reassured and she went home with her son and her son 

in law.

## 2019-09-15 NOTE — EKG
Test Reason : 

Blood Pressure : ***/*** mmHG

Vent. Rate : 070 BPM     Atrial Rate : 070 BPM

   P-R Int : 154 ms          QRS Dur : 072 ms

    QT Int : 388 ms       P-R-T Axes : 000 -17 001 degrees

   QTc Int : 419 ms

 

NORMAL SINUS RHYTHM

NORMAL ECG

 

Confirmed by MD RODRIGUES MOYSES ([id number]) on 9/15/2019 5:01:08 PM

 

Referred By:             Confirmed By:ANAYA RODRIGUES MD

## 2020-01-26 ENCOUNTER — HOSPITAL ENCOUNTER (INPATIENT)
Dept: HOSPITAL 74 - JER | Age: 80
LOS: 3 days | Discharge: NURSING, REHAB OR LONG-TERM CARE | DRG: 57 | End: 2020-01-29
Attending: NURSE PRACTITIONER | Admitting: GENERAL ACUTE CARE HOSPITAL
Payer: COMMERCIAL

## 2020-01-26 VITALS — BODY MASS INDEX: 27.4 KG/M2

## 2020-01-26 DIAGNOSIS — Z85.3: ICD-10-CM
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
## 2020-01-26 DIAGNOSIS — E78.5: ICD-10-CM

## 2020-01-26 DIAGNOSIS — Z88.0: ICD-10-CM
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
## 2020-01-26 DIAGNOSIS — G20: Primary | ICD-10-CM

## 2020-01-26 DIAGNOSIS — R55: ICD-10-CM

## 2020-01-26 DIAGNOSIS — I10: ICD-10-CM
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
## 2020-01-26 LAB
ALBUMIN SERPL-MCNC: 3.7 G/DL (ref 3.4–5)
ALP SERPL-CCNC: 71 U/L (ref 45–117)
ALT SERPL-CCNC: 9 U/L (ref 13–61)
ANION GAP SERPL CALC-SCNC: 7 MMOL/L (ref 8–16)
AST SERPL-CCNC: 21 U/L (ref 15–37)
BASOPHILS # BLD: 0.3 % (ref 0–2)
BILIRUB SERPL-MCNC: 0.4 MG/DL (ref 0.2–1)
BUN SERPL-MCNC: 13.3 MG/DL (ref 7–18)
CALCIUM SERPL-MCNC: 9.7 MG/DL (ref 8.5–10.1)
CHLORIDE SERPL-SCNC: 103 MMOL/L (ref 98–107)
CO2 SERPL-SCNC: 28 MMOL/L (ref 21–32)
CREAT SERPL-MCNC: 0.7 MG/DL (ref 0.55–1.3)
DEPRECATED RDW RBC AUTO: 13.4 % (ref 11.6–15.6)
EOSINOPHIL # BLD: 0.6 % (ref 0–4.5)
GLUCOSE SERPL-MCNC: 89 MG/DL (ref 74–106)
HCT VFR BLD CALC: 37.1 % (ref 32.4–45.2)
HGB BLD-MCNC: 12.5 GM/DL (ref 10.7–15.3)
LYMPHOCYTES # BLD: 12.5 % (ref 8–40)
MCH RBC QN AUTO: 33 PG (ref 25.7–33.7)
MCHC RBC AUTO-ENTMCNC: 33.7 G/DL (ref 32–36)
MCV RBC: 97.9 FL (ref 80–96)
MONOCYTES # BLD AUTO: 6.5 % (ref 3.8–10.2)
NEUTROPHILS # BLD: 80.1 % (ref 42.8–82.8)
PH UR: 8 [PH] (ref 5–8)
PLATELET # BLD AUTO: 227 K/MM3 (ref 134–434)
PMV BLD: 8.1 FL (ref 7.5–11.1)
POTASSIUM SERPLBLD-SCNC: 4.2 MMOL/L (ref 3.5–5.1)
PROT SERPL-MCNC: 6.9 G/DL (ref 6.4–8.2)
RBC # BLD AUTO: 3.8 M/MM3 (ref 3.6–5.2)
SODIUM SERPL-SCNC: 137 MMOL/L (ref 136–145)
SP GR UR: 1.02 (ref 1.01–1.03)
UROBILINOGEN UR STRIP-MCNC: 0.2 MG/DL (ref 0.2–1)
WBC # BLD AUTO: 10.8 K/MM3 (ref 4–10)

## 2020-01-26 RX ADMIN — ATORVASTATIN CALCIUM SCH MG: 10 TABLET, FILM COATED ORAL at 21:36

## 2020-01-26 RX ADMIN — CARBIDOPA AND LEVODOPA SCH EACH: 25; 100 TABLET ORAL at 21:36

## 2020-01-26 NOTE — PDOC
Documentation entered by Ila Cruz SCRIBE, acting as scribe for 

Elina Neri DO.








Elina Neri DO:  This documentation has been prepared by the Anthony lubin Nirvannie, SCRIBE, under my direction and personally reviewed by me in 

its entirety.  I confirm that the documentation accurately reflects all work, 

treatment, procedures, and medical decision making performed by me.  





Attending Attestation





- Resident


Resident Name: Monet Urias





- ED Attending Attestation


I have performed the following: I have examined & evaluated the patient, The 

case was reviewed & discussed with the resident, I agree w/resident's findings 

& plan, Exceptions are as noted





- HPI


HPI: 





01/26/20 14:36


The patient is an 79 year old female, with a significant past medical history 

of HTN, Parkinsons disease, left breast ca (s/p lumpectomy and radiation), and 

arthritis, who presents to the emergency department s/p mechanical fall. As per 

patients daughter at bedside, the patient has a home health aide Monday to 

Friday 9-5 and on weekends her elderly  tends to her. She notes the 

patient was attempting to come out of bed with the assistance of her  at 

which time she fell out of bed onto her knees. Patient could not get back into 

bed until the arrival of her daughter. She endorses bilateral knee pain, 

prompting her arrival to the ED.





Family denies any LOC, acute change in strength/sensation, or dizziness.She 

denies recent chest pain or shortness of breath. She denies recent  dysuria, 

frequency, urgency or hematuria. 





Allergies: Iodine, Meperidine HCl, Morphine, Penicillins (hives), Mycins 


Primary Care Physician: Dr. Gibson  








- Physicial Exam


PE: 





01/26/20 15:05


Constitutional: Awake, alert, oriented.  No acute distress.


Head:  Normocephalic.  Atraumatic


Eyes:  PERRL. EOMI.  Conjunctivae are not pale.


ENT:  Mucous membranes are moist and intact. Posterior pharynx without exudates 

or erythema. Uvula midline.


Neck:  Supple.  Full ROM. No lymphadenopathy.


Cardiovascular:  Regular rate.  Regular rhythm. S1, S2 regular.  Distal pulses 

are 2+ and symmetric.  


Pulmonary/Chest:  No evidence of respiratory distress.  Clear to auscultation 

bilaterally  No wheezing, rales or rhonchi.


Abdominal:  Soft and non-distended.  There is no tenderness.  No rebound, 

guarding or rigidity.  No organomegaly. No palpable masses. Good bowel sounds.


Back:  No CVA tenderness.


Musculoskeletal:  No edema.  No cyanosis.  No clubbing.  Full range of motion 

in all extremities.  No calf tenderness. Radial/pedal pulses are intact and 2+ 

bilaterally


Skin:  Skin is warm and dry.  No petechiae.  No purpura.  


Neurological:  Strength is 4/5 to the bilateral upper and lower extremities. 

Diffuse weakness to the extremities. Alert and oriented to person, place, and 

time.  Cranial nerves II-XII are grossly intact. Normal speech.  No sensory 

deficits.


Psychiatric:  Good eye contact.  Normal interaction, affect and behavior.








- Medical Decision Making





01/26/20 15:10


a/p: 80yo female with hx of parkinsons disease with a fall this AM


-during the week 9-5 pt has a home health aide, but at night and on the 

weekends the patient is cared for by her elderly 


-per the daughter, pt requires more care than can be provided at home


-daughter states her mother needs rehab and placement


-pt states she feels generally weak all over but can ranger her joints


-banged her knees when she fell and was unble to get up, no hip pain or ttp


-no back pain, no neck pain, no head injury, just becoming more weak


-will send labs, ua, ekg, cxr


-will monitor and reassess


01/26/20 15:14


no acute findings on labs


imaging pending





01/26/20 15:50


knee xray shows old healed fibula fx


microblog sent to [facility] for admission for inability to ambulate, falls, 

unsafe discharge plan


01/26/20 16:35


resident discussed the case with [facility] who accepts pt to service





**Heart Score/ECG Review





- ECG Intrepretation


Comment:: 





01/26/20 15:13


sinus at 72, q waves inferior leads which are age indeterminate, no acute st/t 

wave findings

## 2020-01-26 NOTE — HP
79 F h/o HTN, Parkinsons disease, left breast ca (s/p lumpectomy and radiation)

, and arthritis, presents s/p fall after trying to get out of bed. Patient 

endorses trying to step on her stepper to get out of bed, when it slid 

underneath the bed she missed a step and fell on both knees. Had trouble 

getting back into bed until her daughter came and helped her. Patient denies 

hitting her head or chest, denies LOC, denies CP/SOB/dizziness/cough/chest 

pressure, urinary symptoms, N/V/D. Otherwise daughter endorses patient was in 

her usual state of health. Patients daughter endorses having great deal of 

difficulty taking care of her mother, and her father being he is 88 years old 

also having trouble caring for her, and is looking to place her in a skilled 

nursing facility. 





 Vital Signs - 24 hr











  01/26/20 01/26/20 01/26/20





  13:35 14:11 17:38


 


Temperature 97.4 F L  97.6 F


 


Pulse Rate 76  


 


Pulse Rate [   76





Right Radial]   


 


Respiratory 18  20





Rate   


 


Blood Pressure 138/53 L  


 


Blood Pressure   142/62





[Right Arm]   


 


O2 Sat by Pulse 94 L 94 L 





Oximetry (%)   














  01/26/20





  18:49


 


Temperature 97.4 F L


 


Pulse Rate 64


 


Pulse Rate [ 





Right Radial] 


 


Respiratory 16





Rate 


 


Blood Pressure 138/60


 


Blood Pressure 





[Right Arm] 


 


O2 Sat by Pulse 





Oximetry (%) 














PE


GA AAox3, speaking in full sentences, elderly female, lying in stretcher


HEENT NC, R erythematous/swollen R side of face with R eye occlusion (patient 

and patients daughter endorsed she had for many years flares up and down), 

Neck supple but notable limited due to arthritis, MMM


Chest CTAB, no crackles or wheezing


CVS S1, S2+, ISAMAR+, RRR


Abd Soft, NT, ND, BS+, no guarding


Ext slightly contracted LE, mild area of erythema in both knees, good ROM in 

both knees but slightly limited due to arthritis, no patellar tenderness to 

palpation 





 Laboratory Results - last 24 hr











  01/26/20 01/26/20 01/26/20





  14:30 14:30 14:30


 


WBC  10.8 H  


 


RBC  3.80  


 


Hgb  12.5  


 


Hct  37.1  


 


MCV  97.9 H  


 


MCH  33.0  


 


MCHC  33.7  


 


RDW  13.4  


 


Plt Count  227  


 


MPV  8.1  


 


Absolute Neuts (auto)  8.6 H  


 


Neutrophils %  80.1  


 


Lymphocytes %  12.5  D  


 


Monocytes %  6.5  


 


Eosinophils %  0.6  


 


Basophils %  0.3  


 


Nucleated RBC %  0  


 


Sodium   137 


 


Potassium   4.2 


 


Chloride   103 


 


Carbon Dioxide   28 


 


Anion Gap   7 L 


 


BUN   13.3 


 


Creatinine   0.7 


 


Est GFR (CKD-EPI)AfAm   94.18 


 


Est GFR (CKD-EPI)NonAf   81.26 


 


Random Glucose   89 


 


Calcium   9.7 


 


Total Bilirubin   0.4 


 


AST   21 


 


ALT   9 L 


 


Alkaline Phosphatase   71 


 


Creatine Kinase   145 


 


Troponin I   < 0.02 


 


Total Protein   6.9 


 


Albumin   3.7 


 


Urine Color    Yellow


 


Urine Appearance    Clear


 


Urine pH    8.0


 


Ur Specific Gravity    1.020


 


Urine Protein    Negative


 


Urine Glucose (UA)    Negative


 


Urine Ketones    Negative


 


Urine Blood    Negative


 


Urine Nitrite    Negative


 


Urine Bilirubin    Negative


 


Urine Urobilinogen    0.2


 


Ur Leukocyte Esterase    Negative








 Home Medications











 Medication  Instructions  Recorded


 


Aspirin [ASA -] 81 mg PO DAILY 11/07/16


 


Atorvastatin Ca [Lipitor] 10 mg PO HS 11/07/16


 


Carbidopa/Levodopa 50 - 200 each PO TID 11/07/16





[Carbidopa-Levodopa  Tab]  


 


Fosinopril Sodium [Fosinopril 20 mg PO DAILY 11/07/16





Sodium -]  














A/P:





79 F h/o Parkinsons, HTN, arthritis presents s/p mechanical fall when trying 

to ambulate out of bed, admitted for SNF placement and pain control. 





S/p mechanical fall


Likely due to marked deconditioning from Parkinsons, daughter endorses having 

trouble to take care of her mother, needs further assistance with ADLs and IADL

s, appears patient cannot care for herself, will consult  for SNF placement. 


-Pain control with IV Tylenol as needed 


-Await official read for b/l knees, CT-head (d/t ?hitting of her head/poor 

historian)





HTN


Restart home BP meds as tolerated





Arthritis


Will need further PT evaluation for gait and transfer


At baseline pt. requires assistance when ambulating, also uses rollator walker





DVT ppx: Heparin SC


No IVF, PO hydration, soft diet


Admit to Med-surg





Spoke to patients daughter about resuscitation, as per daughter, patient 

endorsed to her she does not want any chest compressions or breathing machine 

if her heart stops, will confirm with patient and apply DNR/DNI as needed. 





Visit type





- Emergency Visit


Emergency Visit: Yes


ED Registration Date: 01/26/20


Care time: The patient presented to the Emergency Department on the above date 

and was hospitalized for further evaluation of their emergent condition.





- New Patient


This patient is new to me today: Yes


Date on this admission: 01/26/20





- Critical Care


Critical Care patient: No

## 2020-01-26 NOTE — PDOC
History of Present Illness





- General


Stated Complaint: FALL


Time Seen by Provider: 01/26/20 13:39





- History of Present Illness


Initial Comments: 





01/26/20 14:16


Pt is an 80y/o female with HTN, HLD, Parkinson's, and breast cancer s/p 

radiation 5 years ago who presents following a  fall. The pt has 

difficulty ambulating with a walker currently, and pt's daughter reports she 

has gradually been declining in functional status over the last several months. 

She has a HHA 9-5 M-F, and her  cares for her during the weekend. She 

was trying to get out of bed with his assistance today and feel on the floor on 

her knees. The daughter was called to the house and was able to help her in the 

bed and EMS was called. She reports right knee tenderness. She denies feeling 

ill recently, headache, dizziness, loss of appetite, chest pain, dysuria, or 

increase in weakness. 








Past History





- Past Medical History


Allergies/Adverse Reactions: 


 Allergies











Allergy/AdvReac Type Severity Reaction Status Date / Time


 


iodine [Iodine] Allergy Intermediate Hives Verified 01/26/20 16:52


 


meperidine HCl [From Demerol] Allergy Intermediate  Verified 01/26/20 16:52


 


morphine Allergy Intermediate  Verified 01/26/20 16:52


 


chocolate flavor Allergy   Verified 01/26/20 16:52


 


Penicillins Allergy   Verified 01/26/20 16:52


 


MYCINS Allergy Intermediate  Uncoded 01/26/20 16:52











Home Medications: 


Ambulatory Orders





Aspirin [ASA -] 81 mg PO DAILY 11/07/16 


Atorvastatin Ca [Lipitor] 10 mg PO HS 11/07/16 


Carbidopa/Levodopa [Carbidopa-Levodopa  Tab] 50 - 200 each PO TID 11/07/ 16 


Fosinopril Sodium [Fosinopril Sodium -] 20 mg PO DAILY 11/07/16 








Anemia: No


Asthma: No


Cancer: No


Cardiac Disorders: No


CVA: No


COPD: No


CHF: No


Dementia: No


Diabetes: No


HTN: Yes


Hypercholesterolemia: Yes


Liver Disease: No


Seizures: No


Other medical history: Parkinson's





- Surgical History


Abdominal Surgery: Yes (gb 26 yrs ago)


Appendectomy: No


Cardiac Surgery: No


Cholecystectomy: Yes


Lung Surgery: No


Neurologic Surgery: No (2009)


Orthopedic Surgery: No





- Psycho Social/Smoking Cessation Hx


Smoking History: Never smoked


Have you smoked in the past 12 months: No


If you are a former smoker, when did you quit?: 45 years ago


Hx Alcohol Use: No


Drug/Substance Use Hx: No


Substance Use Type: None


Hx Substance Use Treatment: No





**Review of Systems





- Review of Systems


Constitutional: No: Chills, Fever


Respiratory: No: Shortness of Breath


Cardiac (ROS): No: Chest Pain


ABD/GI: No: Nausea


: No: Dysuria


Musculoskeletal: Yes: Joint Pain


Neurological: No: Headache, Dizziness





*Physical Exam





- Physical Exam


General Appearance: Yes: Nourished, Appropriately Dressed.  No: Apparent 

Distress


HEENT: positive: EOMI, MICHAEL


Neck: positive: Trachea midline


Respiratory/Chest: positive: Lungs Clear


Cardiovascular: positive: Regular Rhythm, Regular Rate.  negative: Murmur


Gastrointestinal/Abdominal: positive: Normal Bowel Sounds.  negative: Tender


Integumentary: positive: Other (b/l knee erythema/abrasions no bleeding; 

chronic erythematous plaque rash on face)


Neurologic: positive: Alert, Normal Mood/Affect, Other (able to flex at hips, 

flex and extend at knees and feet 4/5;  strength 5/5; left UE tremor)





ED Treatment Course





- LABORATORY


CBC & Chemistry Diagram: 


 01/26/20 14:30





 01/26/20 14:30





Medical Decision Making





- Medical Decision Making





01/26/20 14:35


Pt is an 80y/o female with HTN, HLD, Parkinson's, and breast cancer s/p 

radiation 5 years ago who presents following a  fall. The pt has 

difficulty ambulating with a walker currently, and pt's daughter reports she 

has gradually been declining in functional status over the last several months.


ddx: mechanical fall, UTI, failure to thrive, inability to ambulate


orders: CBC, CMP, EKG, UA, urine cx, troponin, b/l knee x-rays, CT head





01/26/20 14:43


EKG- NSR HR 72, no acute ST changes, QTc 440





01/26/20 15:20


WBC 10.8, afebrile, UA negative





01/26/20 16:09


Will admit to [facility].





Discharge





- Discharge Information


Problems reviewed: Yes


Clinical Impression/Diagnosis: 


Fall


Qualifiers:


 Encounter type: initial encounter Qualified Code(s): W19.XXXA - Unspecified 

fall, initial encounter








- Admission


Yes





- Follow up/Referral





- Patient Discharge Instructions





- Post Discharge Activity

## 2020-01-27 RX ADMIN — Medication SCH: at 21:27

## 2020-01-27 RX ADMIN — CARBIDOPA AND LEVODOPA SCH EACH: 25; 100 TABLET ORAL at 09:35

## 2020-01-27 RX ADMIN — CARBIDOPA AND LEVODOPA SCH EACH: 25; 100 TABLET ORAL at 13:40

## 2020-01-27 RX ADMIN — CARBIDOPA AND LEVODOPA SCH EACH: 25; 100 TABLET ORAL at 15:04

## 2020-01-27 RX ADMIN — CARBIDOPA AND LEVODOPA SCH EACH: 25; 100 TABLET ORAL at 20:33

## 2020-01-27 RX ADMIN — LIDOCAINE SCH: 50 PATCH TOPICAL at 15:08

## 2020-01-27 RX ADMIN — ATORVASTATIN CALCIUM SCH MG: 10 TABLET, FILM COATED ORAL at 21:23

## 2020-01-27 NOTE — EKG
Test Reason : 

Blood Pressure : ***/*** mmHG

Vent. Rate : 072 BPM     Atrial Rate : 072 BPM

   P-R Int : 142 ms          QRS Dur : 090 ms

    QT Int : 402 ms       P-R-T Axes : 033 -04 023 degrees

   QTc Int : 440 ms

 

*** POOR DATA QUALITY, INTERPRETATION MAY BE ADVERSELY AFFECTED

NORMAL SINUS RHYTHM

NORMAL ECG

WHEN COMPARED WITH ECG OF 14-SEP-2019 17:56,

NO SIGNIFICANT CHANGE WAS FOUND

Confirmed by Elin Pedroza ([id number]) on 1/27/2020 2:04:05 PM

 

Referred By:             Confirmed By:Elin Pedroza

## 2020-01-28 RX ADMIN — HEPARIN SODIUM SCH: 5000 INJECTION, SOLUTION INTRAVENOUS; SUBCUTANEOUS at 22:09

## 2020-01-28 RX ADMIN — CARBIDOPA AND LEVODOPA SCH EACH: 25; 100 TABLET ORAL at 13:37

## 2020-01-28 RX ADMIN — DOCUSATE SODIUM SCH MG: 100 CAPSULE, LIQUID FILLED ORAL at 22:02

## 2020-01-28 RX ADMIN — LISINOPRIL SCH MG: 10 TABLET ORAL at 10:49

## 2020-01-28 RX ADMIN — Medication SCH: at 22:09

## 2020-01-28 RX ADMIN — CARBIDOPA AND LEVODOPA SCH EACH: 25; 100 TABLET ORAL at 19:30

## 2020-01-28 RX ADMIN — CARBIDOPA AND LEVODOPA SCH EACH: 25; 100 TABLET ORAL at 10:49

## 2020-01-28 RX ADMIN — ATORVASTATIN CALCIUM SCH MG: 10 TABLET, FILM COATED ORAL at 22:03

## 2020-01-28 RX ADMIN — LIDOCAINE SCH: 50 PATCH TOPICAL at 10:49

## 2020-01-28 NOTE — PN
Physical Exam: 


SUBJECTIVE: Patient seen and examined.  sitting up, tells me she feels fine, 

wishes she could walk more. 





OBJECTIVE:


Patient is an 79 year old female with a significant past medical history of 

parkinsons disease, hypertension and arthritis.  She also has an implanted deep 

brain stimulator for her parkinsons disease.  Patient presents to the ED on 1/26 /2020 after a mechnical fall when attempting to ambulate. 


Vital Signs











 Period  Temp  Pulse  Resp  BP Sys/[place]  Pulse Ox


 


 Last 24 Hr  97.6 F-98.6 F  68-74  18-20  127-175/51-80  94-98








GENERAL: The patient is awake, alert, speech is mumbled but appears to be alert 

and oriented x 3.


HEAD: Normal with no signs of trauma.


EYES: PERRL, extraocular movements intact, sclera anicteric, conjunctiva clear. 

No ptosis. 


ENT: Ears normal, nares patent, oropharynx clear without exudates 


NECK: Trachea midline, full range of motion, supple. 


LUNGS: Breath sounds equal, clear to auscultation bilaterally, no wheezes 


HEART: Regular rate and rhythm 


ABDOMEN: implanted stimulator on right upper abdomen.


EXTREMITIES: 2+ pulses, warm, well-perfused, no edema. - foot drop


NEUROLOGICAL: Normal speech, gait not observed.


PSYCH: Normal mood, normal affect.


SKIN: Warm, dry, normal turgor, no rashes or lesions noted





Active Medications











Generic Name Dose Route Start Last Admin





  Trade Name Freq  PRN Reason Stop Dose Admin


 


Atorvastatin Calcium  10 mg  01/26/20 22:00  01/27/20 21:23





  Lipitor -  PO   10 mg





  HS BIANCA   Administration





     





     





     





     


 


Carbidopa/Levodopa  2 each  01/26/20 21:30  01/28/20 13:37





  Sinemet 25/100 -  PO   2 each





  TID@1030,1430,1930 BIANCA   Administration





     





     





     





     


 


Lidocaine  1 patch  01/27/20 14:00  01/28/20 10:49





  Lidoderm Patch -  TP   Not Given





  DAILY BIANCA   





     





     





     





     


 


Lisinopril  25 mg  01/28/20 10:00  01/28/20 10:49





  Prinivil  PO   25 mg





  DAILY BIANCA   Administration





     





     





     





     


 


Miscellaneous  1 each  01/27/20 22:00  01/27/20 21:27





  Lidoderm Patch Removal  MC   Not Given





  DAILY@2200 BIANCA   





     





     





     





     











ASSESSMENT/PLAN:








Problem List





- Problems


(1) Weakness


Assessment/Plan: 


for physical therapy 


s/p mechanical fall at home


maintain safety


Code(s): R53.1 - WEAKNESS   





(2) Parkinson disease


Assessment/Plan: 


on sinemet


soft diet, swallow eval in a.m. 


Code(s): G20 - PARKINSON'S DISEASE   





(3) Breast cancer, left breast


Assessment/Plan: 


stable, outpatient follow up. 


Code(s): C50.912 - MALIGNANT NEOPLASM OF UNSPECIFIED SITE OF LEFT FEMALE BREAST

   





(4) Fever


Assessment/Plan: 


no fevers currently


Code(s): R50.9 - FEVER, UNSPECIFIED   


Qualifiers: 


   Encounter type: initial encounter 





(5) HLD (hyperlipidemia)


Assessment/Plan: 


lipid panel in am. 


on lipitor


Code(s): E78.5 - HYPERLIPIDEMIA, UNSPECIFIED   





(6) HTN (hypertension)


Assessment/Plan: 


on lisonopril, monitor bp


Code(s): I10 - ESSENTIAL (PRIMARY) HYPERTENSION   


Qualifiers: 


   Hypertension type: essential hypertension   Qualified Code(s): I10 - 

Essential (primary) hypertension   





(7) Near syncope


Assessment/Plan: 


pt evaluation for SNF placement


fall risk


Code(s): R55 - SYNCOPE AND COLLAPSE   





(8) Pre-syncope


Assessment/Plan: 


maintain safety


Code(s): R55 - SYNCOPE AND COLLAPSE   





(9) Accident due to mechanical fall without injury


Code(s): W19.XXXA - UNSPECIFIED FALL, INITIAL ENCOUNTER   





(10) Prophylactic measure


Assessment/Plan: 


Fluids: adequate PO intake, monitor intake.


Electrolytes: replete as indicated


Nutrition: soft diet





DVT prophylaxis: heparin bid


Code(s): Z29.9 - ENCOUNTER FOR PROPHYLACTIC MEASURES, UNSPECIFIED   





Visit type





- Emergency Visit


Emergency Visit: Yes


ED Registration Date: 01/26/20


Care time: The patient presented to the Emergency Department on the above date 

and was hospitalized for further evaluation of their emergent condition.





- New Patient


This patient is new to me today: No





- Critical Care


Critical Care patient: No





- Discharge Referral


Referred to [facility] No

## 2020-01-29 VITALS — SYSTOLIC BLOOD PRESSURE: 125 MMHG | HEART RATE: 64 BPM | DIASTOLIC BLOOD PRESSURE: 62 MMHG | TEMPERATURE: 99 F

## 2020-01-29 LAB
ALBUMIN SERPL-MCNC: 3.2 G/DL (ref 3.4–5)
ALP SERPL-CCNC: 65 U/L (ref 45–117)
ALT SERPL-CCNC: 15 U/L (ref 13–61)
ANION GAP SERPL CALC-SCNC: 6 MMOL/L (ref 8–16)
AST SERPL-CCNC: 26 U/L (ref 15–37)
BASOPHILS # BLD: 0.5 % (ref 0–2)
BILIRUB SERPL-MCNC: 0.7 MG/DL (ref 0.2–1)
BUN SERPL-MCNC: 12.6 MG/DL (ref 7–18)
CALCIUM SERPL-MCNC: 8.8 MG/DL (ref 8.5–10.1)
CHLORIDE SERPL-SCNC: 107 MMOL/L (ref 98–107)
CHOLEST SERPL-MCNC: 152 MG/DL (ref 50–200)
CO2 SERPL-SCNC: 24 MMOL/L (ref 21–32)
CREAT SERPL-MCNC: 0.6 MG/DL (ref 0.55–1.3)
DEPRECATED RDW RBC AUTO: 13.8 % (ref 11.6–15.6)
EOSINOPHIL # BLD: 5.2 % (ref 0–4.5)
GLUCOSE SERPL-MCNC: 90 MG/DL (ref 74–106)
HCT VFR BLD CALC: 37.6 % (ref 32.4–45.2)
HDLC SERPL-MCNC: 70 MG/DL (ref 40–60)
HGB BLD-MCNC: 12.7 GM/DL (ref 10.7–15.3)
LDLC SERPL CALC-MCNC: 69 MG/DL (ref 5–100)
LYMPHOCYTES # BLD: 21.4 % (ref 8–40)
MAGNESIUM SERPL-MCNC: 2.2 MG/DL (ref 1.8–2.4)
MCH RBC QN AUTO: 33.3 PG (ref 25.7–33.7)
MCHC RBC AUTO-ENTMCNC: 33.7 G/DL (ref 32–36)
MCV RBC: 98.8 FL (ref 80–96)
MONOCYTES # BLD AUTO: 9.5 % (ref 3.8–10.2)
NEUTROPHILS # BLD: 63.4 % (ref 42.8–82.8)
PLATELET # BLD AUTO: 209 K/MM3 (ref 134–434)
PMV BLD: 8.7 FL (ref 7.5–11.1)
POTASSIUM SERPLBLD-SCNC: 3.9 MMOL/L (ref 3.5–5.1)
PROT SERPL-MCNC: 6.3 G/DL (ref 6.4–8.2)
RBC # BLD AUTO: 3.8 M/MM3 (ref 3.6–5.2)
SODIUM SERPL-SCNC: 138 MMOL/L (ref 136–145)
TRIGL SERPL-MCNC: 71 MG/DL (ref 0–150)
WBC # BLD AUTO: 8.8 K/MM3 (ref 4–10)

## 2020-01-29 RX ADMIN — CARBIDOPA AND LEVODOPA SCH EACH: 25; 100 TABLET ORAL at 10:08

## 2020-01-29 RX ADMIN — CARBIDOPA AND LEVODOPA SCH EACH: 25; 100 TABLET ORAL at 14:48

## 2020-01-29 RX ADMIN — HEPARIN SODIUM SCH UNIT: 5000 INJECTION, SOLUTION INTRAVENOUS; SUBCUTANEOUS at 09:52

## 2020-01-29 RX ADMIN — LISINOPRIL SCH MG: 10 TABLET ORAL at 09:52

## 2020-01-29 RX ADMIN — DOCUSATE SODIUM SCH MG: 100 CAPSULE, LIQUID FILLED ORAL at 14:49

## 2020-01-29 RX ADMIN — DOCUSATE SODIUM SCH MG: 100 CAPSULE, LIQUID FILLED ORAL at 06:21

## 2020-01-29 RX ADMIN — LIDOCAINE SCH PATCH: 50 PATCH TOPICAL at 09:52

## 2020-01-29 NOTE — DS
Physical Exam: 


SUBJECTIVE: Patient seen and examined


79 F h/o Parkinsons, HTN, arthritis presents s/p mechanical fall when trying 

to ambulate out of bed. Mediclaly stable for discharge to  SNF.








OBJECTIVE:





 Vital Signs











 Period  Temp  Pulse  Resp  BP Sys/Redding  Pulse Ox


 


 Last 24 Hr  97.4 F-98.6 F  60-71  18-20  127-175/58-73  95-98








PHYSICAL EXAM





Constitutional: Yes: Well Nourished, No Distress, Calm


Eyes: Yes: WNL, Conjunctiva Clear


HENT: Yes: WNL, Atraumatic, Normocephalic


Neck: Yes: WNL, Supple, Trachea Midline


Cardiovascular: Yes: WNL, Regular Rate and Rhythm


Respiratory: Yes: WNL, Regular, CTA Bilaterally


Gastrointestinal: Yes: WNL, Normal Bowel Sounds


...Rectal Exam: Yes: Deferred


Genitourinary: Yes: WNL


Breast(s): Yes: WNL


Musculoskeletal: Yes: Joint Swelling (BL knee pain with eccymosis R>L)


Extremities: Yes: WNL


Edema: No


Peripheral Pulses WNL: Yes


Peripheral Pulses: Left Radial: 2+, Right Radial: 2+, Left Doralis Pedis: 2+, 

Right Dorsalis Pedis: 2+, Left Femoral: 2+, Right Femoral: 2+


Integumentary: Yes: Bruising (knees BL)


Neurological: Yes: WNL, Alert, Oriented


...Motor Strength: LLE (generalized weakness), RLE


Psychiatric: Yes: WNL





X-ray: Report Reviewed (Knee without acute findings)


Cat Scan: Report Reviewed (HCT without acute findings)








HOSPITAL COURSE:





Date of Admission:01/26/20





Date of Discharge: 01/29/20








Problem List





- Problems


(1) Breast cancer, left breast


Assessment/Plan: 


stable


Code(s): C50.912 - MALIGNANT NEOPLASM OF UNSPECIFIED SITE OF LEFT FEMALE BREAST

   





(2) Arthritis


Assessment/Plan: 


c/o pain to BL knees R>L


no acute findings in Xrays


lidoderm patch to right knee


Code(s): M19.90 - UNSPECIFIED OSTEOARTHRITIS, UNSPECIFIED SITE   





(3) Prophylactic measure


Assessment/Plan: 


FEN


Fluids: adequate PO intake


Electrolytes: replete as indicated


Nutrition: soft diet





DVT prophylaxis: 


oob, ambulation with PT





Dispo:


continues to require inpatient care.


Full code


discharge planning to SNF





Code(s): Z29.9 - ENCOUNTER FOR PROPHYLACTIC MEASURES, UNSPECIFIED   





(4) HLD (hyperlipidemia)


Assessment/Plan: 


c/w lipitor


Code(s): E78.5 - HYPERLIPIDEMIA, UNSPECIFIED   





(5) HTN (hypertension)


Assessment/Plan: 


on lisinipril at home


BP elevated since admission


will increase lisinipril  to 25mg


Code(s): I10 - ESSENTIAL (PRIMARY) HYPERTENSION   


Qualifiers: 


   Hypertension type: essential hypertension   Qualified Code(s): I10 - 

Essential (primary) hypertension   





(6) Parkinson disease


Assessment/Plan: 


c/w sinimet


supportive care


Code(s): G20 - PARKINSON'S DISEASE   





(7) Weakness


Assessment/Plan: 


PT following


discharge to SNF


appreciate SW input


Code(s): R53.1 - WEAKNESS   





(8) Near syncope


Assessment/Plan: 


pt states she lost her footing when getting oob, no LOC


HCT negative


plan for SNF on discharge


Code(s): R55 - SYNCOPE AND COLLAPSE   





Minutes to complete discharge: 35





Discharge Summary


Problems reviewed: Yes


Reason For Visit: WEAKNESS


Current Active Problems





Accident due to mechanical fall without injury (Acute)


Arthritis (Acute)


Breast cancer, left breast (Acute)


Fall (Acute)


Prophylactic measure (Acute)








Hospital Course: 


HOSPITAL COURSE:





Date of Admission:01/26/20





Date of Discharge: 01/29/20








Problem List





- Problems


(1) Breast cancer, left breast


Assessment/Plan: 


stable


Code(s): C50.912 - MALIGNANT NEOPLASM OF UNSPECIFIED SITE OF LEFT FEMALE BREAST

   





(2) Arthritis


Assessment/Plan: 


c/o pain to BL knees R>L


no acute findings in Xrays


lidoderm patch to right knee


Code(s): M19.90 - UNSPECIFIED OSTEOARTHRITIS, UNSPECIFIED SITE   





(3) Prophylactic measure


Assessment/Plan: 


FEN


Fluids: adequate PO intake


Electrolytes: replete as indicated


Nutrition: soft diet





DVT prophylaxis: 


oob, ambulation with PT





Dispo:


continues to require inpatient care.


Full code


discharge planning to SNF





Code(s): Z29.9 - ENCOUNTER FOR PROPHYLACTIC MEASURES, UNSPECIFIED   





(4) HLD (hyperlipidemia)


Assessment/Plan: 


c/w lipitor


Code(s): E78.5 - HYPERLIPIDEMIA, UNSPECIFIED   





(5) HTN (hypertension)


Assessment/Plan: 


on lisinipril at home


BP elevated since admission


will increase lisinipril  to 25mg


Code(s): I10 - ESSENTIAL (PRIMARY) HYPERTENSION   


Qualifiers: 


   Hypertension type: essential hypertension   Qualified Code(s): I10 - 

Essential (primary) hypertension   





(6) Parkinson disease


Assessment/Plan: 


c/w sinimet


supportive care


Code(s): G20 - PARKINSON'S DISEASE   





(7) Weakness


Assessment/Plan: 


PT following


discharge to SNF


appreciate  input


Code(s): R53.1 - WEAKNESS   





(8) Near syncope


Assessment/Plan: 


pt states she lost her footing when getting oob, no LOC


HCT negative


plan for SNF on discharge


Code(s): R55 - SYNCOPE AND COLLAPSE   


Condition: Improved





- Instructions


Diet, Activity, Other Instructions: 


DISCHARGE 





YOUR VISIT


You came to the hospital because you fell at home. All your Xrays and Ct Scans 

were all negative. 





MEDICATIONS


Please continue to take your home medications as prescribed. There was some 

changes:





Lisinipril (blood pressure) was increased to 25mg


Lidoderm patch to knee for pain








DIET


Continue your home diet. Low fat low cholesterol








ADDITIONAL CARE


Please make an appointment to see your primary care provider, Dr Gibson  1 week 

after you are discharged from Rehab 





ADDITIONAL INFORMATION


Please call 911 or come directly to the emergency department if you experience 

unusual headache, vision change, shortness of breath, chest pain, numbness, 

tingling, loss of alertness/awareness, loss of function, unusual bleeding or 

any alarming symptoms.





Thank you for allowing me to care for you.





James Hair, ACNP, DNP


[facility]


Referrals: 


Austin Gibson MD [Primary Care Provider] - 


Disposition: SKILLED NURSING FACILITY





- Home Medications


Comprehensive Discharge Medication List: 


Ambulatory Orders





Aspirin [ASA -] 81 mg PO DAILY 11/07/16 


Atorvastatin Ca [Lipitor] 10 mg PO HS 11/07/16 


Carbidopa/Levodopa [Carbidopa-Levodopa  Tab] 50 - 200 each PO TID 11/07/ 16 


Fosinopril Sodium [Fosinopril Sodium -] 20 mg PO DAILY 11/07/16 








Prescription Drug Monitoring Program (I-STOP) results: I-STOP not reviewed





Problem List





- Problems


(1) Breast cancer, left breast


Code(s): C50.912 - MALIGNANT NEOPLASM OF UNSPECIFIED SITE OF LEFT FEMALE BREAST

   





(2) Arthritis


Code(s): M19.90 - UNSPECIFIED OSTEOARTHRITIS, UNSPECIFIED SITE   





(3) Prophylactic measure


Code(s): Z29.9 - ENCOUNTER FOR PROPHYLACTIC MEASURES, UNSPECIFIED   





(4) HLD (hyperlipidemia)


Code(s): E78.5 - HYPERLIPIDEMIA, UNSPECIFIED   





(5) HTN (hypertension)


Code(s): I10 - ESSENTIAL (PRIMARY) HYPERTENSION   


Qualifiers: 


   Hypertension type: essential hypertension   Qualified Code(s): I10 - 

Essential (primary) hypertension   





(6) Parkinson disease


Code(s): G20 - PARKINSON'S DISEASE   





(7) Weakness


Code(s): R53.1 - WEAKNESS   





(8) Near syncope


Code(s): R55 - SYNCOPE AND COLLAPSE   


This patient is new to me today: No


Emergency Visit: Yes


ED Registration Date: 01/26/20


Care time: The patient presented to the Emergency Department on the above date 

and was hospitalized for further evaluation of their emergent condition.


Critical Care patient: No





- Discharge Referral


Referred to [facility] No

## 2020-01-29 NOTE — CONSULT
Admitting History and Physical





- Primary Care Physician


PCP: James Stringer





- Admission


History of Present Illness: 





Patient is an 79 year old female with a significant past medical history of 

parkinsons disease, hypertension and arthritis.  She also has an implanted deep 

brain stimulator for her parkinsons disease.  Patient presents to the ED on 1/26 /2020 after a mechnical fall when attempting to ambulate. 


History Source: Patient





- Past Medical History


CNS: Yes: Parkinson's (s/p deep brain stimulator)


Cardiovascular: Yes: HTN, Hyperlipdemia


...Pregnant: No


Heme/Onc: Yes: Cancer (lerft breast ca s/p lumpectomy, xrt)


Musculoskeletal: Yes: Osteoarthritis, Other (scoliosis, cervical stenosis)





- Past Surgical History


Past Surgical History: Yes: Breast Biopsy, Cataract Removal, Cholecystectomy, 

Hysterectomy





- Smoking History


Smoking history: Former smoker


Have you smoked in the past 12 months: No


If you are a former smoker, when did you quit?: 45 years ago





- Alcohol/Substance Use


Hx Alcohol Use: No


History of Substance Use: reports: None





- Social History


ADL: Family Assistance


History of Recent Travel: No





History





- Admission


Reason For Visit: WEAKNESS





- Diagnostics


CT Scan: Report Reviewed





- General


Mental Status: Alert and Oriented, Awake and Alert, Able to Follow Commands


Attention: Intact


Ability to Follow Directions: Excellent





- Hearing


Hearing: Normal


Hearing Aide: No


With Patient: No





Speech Evaluation





- Communication


Primary Language: ENGLISH


Communication: Yes: Dysarthria


Oral Expression Ability: Yes: Mild Impairment





- Speech Production


Dysarthria: Yes: Hypokinetic


Intelligibility: Yes: Mildly Impaired





- Speech Characteristics


Voice Loudness: Mildly Soft/Quiet


Voice Pitch: Yes: Normal


Voice Phonatory-based Quality: Yes: Hoarse, Dysphonia


Speech Pattern: Impaired


Speech Clarity: < 100%


Nasal Resonance: Normal


Articulation: Yes: Precise


Rate of Speech: Intact





- Language/Auditory Comprehension


Follows: Yes: 2 Stage Simple Commands





- Language/Verbal Expression


Able to Respond to Simple Queries: Yes: WNL


Able to Communicate Wants and Needs: Yes: WNL


Functional Communication Status: Yes: WNL





- Swallow Evaluation/Bedside Assessment


Current Nutritional Intake: Regular, Thin Liquids


Oral Secretions: Yes: WFL


Dentition: Yes: Adequate


Facial Symmetry at Rest: Symmetrical


Facial Symmetry on Retraction: Symmetrical


Facial Movement: Controlled


Sensation: Normal


Against Resistance Opening: Normal


Against Resistance Closing: Normal


Pucker Lips: Normal


Smile: Normal


Lingual Movement: Normal, Symmetric


Lingual Speed of Movement: Normal


Lingual Movement Strgth Against Opposition: Normal


Lingual Movement Characteristics: Normal


Velopharyngeal Movement: Normal


Laryngeal Elevation: WFL


Laryngeal Movement: Able to Palpate


Rate of Intake: WFL


Bolus Size: WFL


Labial Seal: WFL


Chewing: WFL


Oral Prep Time: WFL


A-P Transit: WFL


Pocketing: None


Timing of Swallow: WFL


Coughing/Throat Clear: No


Change in Voice: No





Recommendations





- Speech Evaluation, Impression/Plan


Impression: Swallowing overtly intact. Dysphonia. Reduced speech volume.





- Dysphagia Impressions/Plan


Swallowing Skills: WFL


Dysphagia Impressions: No Impairment


*Silent aspiration: cannot be R/O at bedside


Recommendations: ENT Consult (as [id number] pt to visualize vocal cords), Other (LSVT/

Voice tx- Pt unsure if she wants it)





- Recommendations


Diet Consistency: Regular


Medication Administration: Whole with water


Liquids: Thin Liquids

## 2021-07-12 ENCOUNTER — HOSPITAL ENCOUNTER (INPATIENT)
Dept: HOSPITAL 74 - JER | Age: 81
LOS: 1 days | Discharge: HOME HEALTH | DRG: 312 | End: 2021-07-13
Attending: NURSE PRACTITIONER | Admitting: GENERAL ACUTE CARE HOSPITAL
Payer: COMMERCIAL

## 2021-07-12 VITALS — BODY MASS INDEX: 24.4 KG/M2

## 2021-07-12 DIAGNOSIS — I47.1: ICD-10-CM

## 2021-07-12 DIAGNOSIS — I65.29: ICD-10-CM

## 2021-07-12 DIAGNOSIS — G20: ICD-10-CM

## 2021-07-12 DIAGNOSIS — I10: ICD-10-CM

## 2021-07-12 DIAGNOSIS — M48.02: ICD-10-CM

## 2021-07-12 DIAGNOSIS — Z85.3: ICD-10-CM

## 2021-07-12 DIAGNOSIS — F03.90: ICD-10-CM

## 2021-07-12 DIAGNOSIS — M41.9: ICD-10-CM

## 2021-07-12 DIAGNOSIS — R55: Primary | ICD-10-CM

## 2021-07-12 DIAGNOSIS — E78.5: ICD-10-CM

## 2021-07-12 LAB
ALBUMIN SERPL-MCNC: 3.6 G/DL (ref 3.4–5)
ALP SERPL-CCNC: 62 U/L (ref 45–117)
ALT SERPL-CCNC: 11 U/L (ref 13–61)
ANION GAP SERPL CALC-SCNC: 9 MMOL/L (ref 8–16)
AST SERPL-CCNC: 26 U/L (ref 15–37)
BASOPHILS # BLD: 0.3 % (ref 0–2)
BILIRUB SERPL-MCNC: 0.6 MG/DL (ref 0.2–1)
BNP SERPL-MCNC: 556.1 PG/ML (ref 5–450)
BUN SERPL-MCNC: 15.3 MG/DL (ref 7–18)
CALCIUM SERPL-MCNC: 8.9 MG/DL (ref 8.5–10.1)
CHLORIDE SERPL-SCNC: 104 MMOL/L (ref 98–107)
CO2 SERPL-SCNC: 24 MMOL/L (ref 21–32)
CREAT SERPL-MCNC: 0.8 MG/DL (ref 0.55–1.3)
DEPRECATED RDW RBC AUTO: 14.3 % (ref 11.6–15.6)
EOSINOPHIL # BLD: 1.3 % (ref 0–4.5)
GLUCOSE SERPL-MCNC: 101 MG/DL (ref 74–106)
HCT VFR BLD CALC: 36.9 % (ref 32.4–45.2)
HGB BLD-MCNC: 12.4 GM/DL (ref 10.7–15.3)
LYMPHOCYTES # BLD: 12.9 % (ref 8–40)
MCH RBC QN AUTO: 32.7 PG (ref 25.7–33.7)
MCHC RBC AUTO-ENTMCNC: 33.4 G/DL (ref 32–36)
MCV RBC: 97.9 FL (ref 80–96)
MONOCYTES # BLD AUTO: 6.2 % (ref 3.8–10.2)
NEUTROPHILS # BLD: 79.3 % (ref 42.8–82.8)
PLATELET # BLD AUTO: 245 10^3/UL (ref 134–434)
PMV BLD: 8.6 FL (ref 7.5–11.1)
PROT SERPL-MCNC: 7 G/DL (ref 6.4–8.2)
RBC # BLD AUTO: 3.77 M/MM3 (ref 3.6–5.2)
SODIUM SERPL-SCNC: 138 MMOL/L (ref 136–145)
WBC # BLD AUTO: 9.5 K/MM3 (ref 4–10)

## 2021-07-12 PROCEDURE — G0378 HOSPITAL OBSERVATION PER HR: HCPCS

## 2021-07-12 PROCEDURE — C9803 HOPD COVID-19 SPEC COLLECT: HCPCS

## 2021-07-12 PROCEDURE — U0005 INFEC AGEN DETEC AMPLI PROBE: HCPCS

## 2021-07-12 PROCEDURE — U0003 INFECTIOUS AGENT DETECTION BY NUCLEIC ACID (DNA OR RNA); SEVERE ACUTE RESPIRATORY SYNDROME CORONAVIRUS 2 (SARS-COV-2) (CORONAVIRUS DISEASE [COVID-19]), AMPLIFIED PROBE TECHNIQUE, MAKING USE OF HIGH THROUGHPUT TECHNOLOGIES AS DESCRIBED BY CMS-2020-01-R: HCPCS

## 2021-07-12 RX ADMIN — CARBIDOPA AND LEVODOPA SCH EACH: 25; 100 TABLET ORAL at 21:59

## 2021-07-13 VITALS — HEART RATE: 68 BPM | TEMPERATURE: 98.4 F | DIASTOLIC BLOOD PRESSURE: 65 MMHG | SYSTOLIC BLOOD PRESSURE: 142 MMHG

## 2021-07-13 LAB
ALBUMIN SERPL-MCNC: 3.3 G/DL (ref 3.4–5)
ALP SERPL-CCNC: 55 U/L (ref 45–117)
ALT SERPL-CCNC: 9 U/L (ref 13–61)
ANION GAP SERPL CALC-SCNC: 7 MMOL/L (ref 8–16)
AST SERPL-CCNC: 16 U/L (ref 15–37)
BASOPHILS # BLD: 0.6 % (ref 0–2)
BILIRUB SERPL-MCNC: 0.6 MG/DL (ref 0.2–1)
BUN SERPL-MCNC: 10.2 MG/DL (ref 7–18)
CALCIUM SERPL-MCNC: 8.3 MG/DL (ref 8.5–10.1)
CHLORIDE SERPL-SCNC: 109 MMOL/L (ref 98–107)
CO2 SERPL-SCNC: 24 MMOL/L (ref 21–32)
CREAT SERPL-MCNC: 0.6 MG/DL (ref 0.55–1.3)
DEPRECATED RDW RBC AUTO: 14.1 % (ref 11.6–15.6)
EOSINOPHIL # BLD: 2.1 % (ref 0–4.5)
GLUCOSE SERPL-MCNC: 79 MG/DL (ref 74–106)
HCT VFR BLD CALC: 34.3 % (ref 32.4–45.2)
HGB BLD-MCNC: 11.7 GM/DL (ref 10.7–15.3)
LYMPHOCYTES # BLD: 26.4 % (ref 8–40)
MAGNESIUM SERPL-MCNC: 2 MG/DL (ref 1.8–2.4)
MCH RBC QN AUTO: 33.1 PG (ref 25.7–33.7)
MCHC RBC AUTO-ENTMCNC: 34.1 G/DL (ref 32–36)
MCV RBC: 97 FL (ref 80–96)
MONOCYTES # BLD AUTO: 7 % (ref 3.8–10.2)
NEUTROPHILS # BLD: 63.9 % (ref 42.8–82.8)
PHOSPHATE SERPL-MCNC: 2.6 MG/DL (ref 2.5–4.9)
PLATELET # BLD AUTO: 232 10^3/UL (ref 134–434)
PMV BLD: 8.1 FL (ref 7.5–11.1)
PROT SERPL-MCNC: 6.3 G/DL (ref 6.4–8.2)
RBC # BLD AUTO: 3.54 M/MM3 (ref 3.6–5.2)
SODIUM SERPL-SCNC: 140 MMOL/L (ref 136–145)
WBC # BLD AUTO: 8.4 K/MM3 (ref 4–10)

## 2021-07-13 RX ADMIN — CARBIDOPA AND LEVODOPA SCH EACH: 25; 100 TABLET ORAL at 14:03

## 2021-07-13 RX ADMIN — CARBIDOPA AND LEVODOPA SCH EACH: 25; 100 TABLET ORAL at 09:47

## 2023-05-01 ENCOUNTER — HOSPITAL ENCOUNTER (OUTPATIENT)
Dept: HOSPITAL 74 - JER | Age: 83
Setting detail: OBSERVATION
LOS: 2 days | Discharge: NURSING, REHAB OR LONG-TERM CARE | End: 2023-05-03
Attending: NURSE PRACTITIONER | Admitting: HOSPITALIST
Payer: COMMERCIAL

## 2023-05-01 VITALS — BODY MASS INDEX: 23.8 KG/M2

## 2023-05-01 DIAGNOSIS — M79.2: ICD-10-CM

## 2023-05-01 DIAGNOSIS — G56.92: Primary | ICD-10-CM

## 2023-05-01 DIAGNOSIS — Z85.3: ICD-10-CM

## 2023-05-01 DIAGNOSIS — Z90.12: ICD-10-CM

## 2023-05-01 DIAGNOSIS — D72.829: ICD-10-CM

## 2023-05-01 DIAGNOSIS — I10: ICD-10-CM

## 2023-05-01 DIAGNOSIS — M19.012: ICD-10-CM

## 2023-05-01 DIAGNOSIS — E78.5: ICD-10-CM

## 2023-05-01 DIAGNOSIS — Z88.0: ICD-10-CM

## 2023-05-01 DIAGNOSIS — Z29.8: ICD-10-CM

## 2023-05-01 DIAGNOSIS — G20: ICD-10-CM

## 2023-05-01 DIAGNOSIS — Z91.048: ICD-10-CM

## 2023-05-01 DIAGNOSIS — Z88.1: ICD-10-CM

## 2023-05-01 DIAGNOSIS — Z88.6: ICD-10-CM

## 2023-05-01 LAB
ALBUMIN SERPL-MCNC: 3.7 G/DL (ref 3.4–5)
ALP SERPL-CCNC: 76 U/L (ref 45–117)
ALT SERPL-CCNC: 14 U/L (ref 13–61)
ANION GAP SERPL CALC-SCNC: 9 MMOL/L (ref 8–16)
AST SERPL-CCNC: 40 U/L (ref 15–37)
BASOPHILS # BLD: 0.3 % (ref 0–2)
BILIRUB SERPL-MCNC: 0.6 MG/DL (ref 0.2–1)
BUN SERPL-MCNC: 13.7 MG/DL (ref 7–18)
CALCIUM SERPL-MCNC: 9.4 MG/DL (ref 8.5–10.1)
CHLORIDE SERPL-SCNC: 100 MMOL/L (ref 98–107)
CO2 SERPL-SCNC: 23 MMOL/L (ref 21–32)
CREAT SERPL-MCNC: 0.9 MG/DL (ref 0.55–1.3)
DEPRECATED RDW RBC AUTO: 13.8 % (ref 11.6–15.6)
EOSINOPHIL # BLD: 0.6 % (ref 0–4.5)
GLUCOSE SERPL-MCNC: 105 MG/DL (ref 74–106)
HCT VFR BLD CALC: 37.7 % (ref 32.4–45.2)
HGB BLD-MCNC: 12.6 GM/DL (ref 10.7–15.3)
LYMPHOCYTES # BLD: 6.6 % (ref 8–40)
MCH RBC QN AUTO: 31.7 PG (ref 25.7–33.7)
MCHC RBC AUTO-ENTMCNC: 33.3 G/DL (ref 32–36)
MCV RBC: 95.3 FL (ref 80–96)
MONOCYTES # BLD AUTO: 6.4 % (ref 3.8–10.2)
NEUTROPHILS # BLD: 86.1 % (ref 42.8–82.8)
PLATELET # BLD AUTO: 307 10^3/UL (ref 134–434)
PMV BLD: 7.4 FL (ref 7.5–11.1)
POTASSIUM SERPLBLD-SCNC: 4.6 MMOL/L (ref 3.5–5.1)
PROT SERPL-MCNC: 7.4 G/DL (ref 6.4–8.2)
RBC # BLD AUTO: 3.95 M/MM3 (ref 3.6–5.2)
SODIUM SERPL-SCNC: 132 MMOL/L (ref 136–145)
WBC # BLD AUTO: 14.6 K/MM3 (ref 4–10)

## 2023-05-01 PROCEDURE — G0378 HOSPITAL OBSERVATION PER HR: HCPCS

## 2023-05-02 LAB
ALBUMIN SERPL-MCNC: 3.4 G/DL (ref 3.4–5)
ALP SERPL-CCNC: 69 U/L (ref 45–117)
ALT SERPL-CCNC: 29 U/L (ref 13–61)
ANION GAP SERPL CALC-SCNC: 6 MMOL/L (ref 8–16)
APPEARANCE UR: CLEAR
AST SERPL-CCNC: 42 U/L (ref 15–37)
BASOPHILS # BLD: 0.5 % (ref 0–2)
BILIRUB SERPL-MCNC: 0.6 MG/DL (ref 0.2–1)
BILIRUB UR STRIP.AUTO-MCNC: NEGATIVE MG/DL
BUN SERPL-MCNC: 13.5 MG/DL (ref 7–18)
CALCIUM SERPL-MCNC: 9.3 MG/DL (ref 8.5–10.1)
CHLORIDE SERPL-SCNC: 103 MMOL/L (ref 98–107)
CO2 SERPL-SCNC: 26 MMOL/L (ref 21–32)
COLOR UR: YELLOW
CREAT SERPL-MCNC: 0.8 MG/DL (ref 0.55–1.3)
DEPRECATED RDW RBC AUTO: 13.6 % (ref 11.6–15.6)
EOSINOPHIL # BLD: 3.2 % (ref 0–4.5)
GLUCOSE SERPL-MCNC: 85 MG/DL (ref 74–106)
HCT VFR BLD CALC: 35.5 % (ref 32.4–45.2)
HGB BLD-MCNC: 12.3 GM/DL (ref 10.7–15.3)
KETONES UR QL STRIP: (no result)
LEUKOCYTE ESTERASE UR QL STRIP.AUTO: NEGATIVE
LYMPHOCYTES # BLD: 19.7 % (ref 8–40)
MAGNESIUM SERPL-MCNC: 2 MG/DL (ref 1.8–2.4)
MCH RBC QN AUTO: 32.9 PG (ref 25.7–33.7)
MCHC RBC AUTO-ENTMCNC: 34.5 G/DL (ref 32–36)
MCV RBC: 95.3 FL (ref 80–96)
MONOCYTES # BLD AUTO: 9.7 % (ref 3.8–10.2)
NEUTROPHILS # BLD: 66.9 % (ref 42.8–82.8)
NITRITE UR QL STRIP: NEGATIVE
PH UR: 6.5 [PH] (ref 5–8)
PHOSPHATE SERPL-MCNC: 3.2 MG/DL (ref 2.5–4.9)
PLATELET # BLD AUTO: 290 10^3/UL (ref 134–434)
PMV BLD: 7.7 FL (ref 7.5–11.1)
POTASSIUM SERPLBLD-SCNC: 4.3 MMOL/L (ref 3.5–5.1)
PROT SERPL-MCNC: 6.8 G/DL (ref 6.4–8.2)
PROT UR QL STRIP: NEGATIVE
PROT UR QL STRIP: NEGATIVE
RBC # BLD AUTO: 3.72 M/MM3 (ref 3.6–5.2)
SODIUM SERPL-SCNC: 135 MMOL/L (ref 136–145)
SP GR UR: 1.01 (ref 1.01–1.03)
UROBILINOGEN UR STRIP-MCNC: 0.2 MG/DL (ref 0.2–1)
WBC # BLD AUTO: 8 K/MM3 (ref 4–10)

## 2023-05-02 PROCEDURE — 3E033NZ INTRODUCTION OF ANALGESICS, HYPNOTICS, SEDATIVES INTO PERIPHERAL VEIN, PERCUTANEOUS APPROACH: ICD-10-PCS | Performed by: NURSE PRACTITIONER

## 2023-05-02 PROCEDURE — 3E033GC INTRODUCTION OF OTHER THERAPEUTIC SUBSTANCE INTO PERIPHERAL VEIN, PERCUTANEOUS APPROACH: ICD-10-PCS | Performed by: NURSE PRACTITIONER

## 2023-05-02 PROCEDURE — 3E0333Z INTRODUCTION OF ANTI-INFLAMMATORY INTO PERIPHERAL VEIN, PERCUTANEOUS APPROACH: ICD-10-PCS | Performed by: NURSE PRACTITIONER

## 2023-05-02 RX ADMIN — ENOXAPARIN SODIUM SCH MG: 40 INJECTION SUBCUTANEOUS at 09:40

## 2023-05-02 RX ADMIN — GABAPENTIN SCH MG: 100 CAPSULE ORAL at 21:16

## 2023-05-02 RX ADMIN — GABAPENTIN SCH MG: 100 CAPSULE ORAL at 09:40

## 2023-05-02 RX ADMIN — CARBIDOPA AND LEVODOPA SCH EACH: 25; 100 TABLET ORAL at 13:07

## 2023-05-02 RX ADMIN — DOCUSATE SODIUM SCH MG: 100 CAPSULE, LIQUID FILLED ORAL at 13:07

## 2023-05-02 RX ADMIN — DOCUSATE SODIUM SCH MG: 100 CAPSULE, LIQUID FILLED ORAL at 06:23

## 2023-05-02 RX ADMIN — ACETAMINOPHEN SCH MG: 325 TABLET ORAL at 13:06

## 2023-05-02 RX ADMIN — LIDOCAINE SCH PATCH: 50 PATCH TOPICAL at 09:40

## 2023-05-02 RX ADMIN — CARBIDOPA AND LEVODOPA SCH EACH: 25; 100 TABLET ORAL at 09:40

## 2023-05-02 RX ADMIN — LISINOPRIL SCH MG: 10 TABLET ORAL at 09:40

## 2023-05-02 RX ADMIN — ASPIRIN 81 MG SCH MG: 81 TABLET ORAL at 09:40

## 2023-05-02 RX ADMIN — DOCUSATE SODIUM SCH MG: 100 CAPSULE, LIQUID FILLED ORAL at 21:14

## 2023-05-02 RX ADMIN — CARBIDOPA AND LEVODOPA SCH EACH: 25; 100 TABLET ORAL at 18:14

## 2023-05-02 RX ADMIN — GABAPENTIN SCH MG: 100 CAPSULE ORAL at 13:07

## 2023-05-02 RX ADMIN — ACETAMINOPHEN SCH MG: 325 TABLET ORAL at 18:07

## 2023-05-02 RX ADMIN — CARBIDOPA AND LEVODOPA SCH EACH: 25; 100 TABLET ORAL at 21:13

## 2023-05-03 VITALS
DIASTOLIC BLOOD PRESSURE: 51 MMHG | RESPIRATION RATE: 20 BRPM | HEART RATE: 60 BPM | SYSTOLIC BLOOD PRESSURE: 104 MMHG | TEMPERATURE: 98.2 F

## 2023-05-03 RX ADMIN — DOCUSATE SODIUM SCH MG: 100 CAPSULE, LIQUID FILLED ORAL at 05:57

## 2023-05-03 RX ADMIN — ACETAMINOPHEN SCH MG: 325 TABLET ORAL at 12:11

## 2023-05-03 RX ADMIN — LISINOPRIL SCH MG: 10 TABLET ORAL at 10:04

## 2023-05-03 RX ADMIN — ENOXAPARIN SODIUM SCH MG: 40 INJECTION SUBCUTANEOUS at 10:04

## 2023-05-03 RX ADMIN — ACETAMINOPHEN SCH MG: 325 TABLET ORAL at 05:58

## 2023-05-03 RX ADMIN — DOCUSATE SODIUM SCH MG: 100 CAPSULE, LIQUID FILLED ORAL at 14:15

## 2023-05-03 RX ADMIN — ASPIRIN 81 MG SCH MG: 81 TABLET ORAL at 10:04

## 2023-05-03 RX ADMIN — LIDOCAINE SCH PATCH: 50 PATCH TOPICAL at 10:04

## 2023-05-03 RX ADMIN — CARBIDOPA AND LEVODOPA SCH EACH: 25; 100 TABLET ORAL at 14:16

## 2023-05-03 RX ADMIN — ACETAMINOPHEN SCH MG: 325 TABLET ORAL at 00:00

## 2023-05-03 RX ADMIN — CARBIDOPA AND LEVODOPA SCH EACH: 25; 100 TABLET ORAL at 10:04

## 2023-05-03 RX ADMIN — GABAPENTIN SCH MG: 100 CAPSULE ORAL at 05:57

## 2023-05-03 RX ADMIN — GABAPENTIN SCH MG: 100 CAPSULE ORAL at 14:15
